# Patient Record
Sex: MALE | Race: WHITE | NOT HISPANIC OR LATINO | Employment: OTHER | URBAN - NONMETROPOLITAN AREA
[De-identification: names, ages, dates, MRNs, and addresses within clinical notes are randomized per-mention and may not be internally consistent; named-entity substitution may affect disease eponyms.]

---

## 2022-02-01 ENCOUNTER — APPOINTMENT (EMERGENCY)
Dept: CT IMAGING | Facility: HOSPITAL | Age: 47
End: 2022-02-01
Payer: COMMERCIAL

## 2022-02-01 ENCOUNTER — HOSPITAL ENCOUNTER (EMERGENCY)
Facility: HOSPITAL | Age: 47
Discharge: HOME | End: 2022-02-01
Attending: STUDENT IN AN ORGANIZED HEALTH CARE EDUCATION/TRAINING PROGRAM | Admitting: STUDENT IN AN ORGANIZED HEALTH CARE EDUCATION/TRAINING PROGRAM
Payer: COMMERCIAL

## 2022-02-01 VITALS
HEART RATE: 87 BPM | BODY MASS INDEX: 24.71 KG/M2 | HEIGHT: 72 IN | OXYGEN SATURATION: 96 % | SYSTOLIC BLOOD PRESSURE: 108 MMHG | TEMPERATURE: 100 F | RESPIRATION RATE: 16 BRPM | WEIGHT: 182.4 LBS | DIASTOLIC BLOOD PRESSURE: 78 MMHG

## 2022-02-01 DIAGNOSIS — S22.31XA CLOSED FRACTURE OF ONE RIB OF RIGHT SIDE, INITIAL ENCOUNTER: Primary | ICD-10-CM

## 2022-02-01 PROCEDURE — G1004 CDSM NDSC: HCPCS

## 2022-02-01 PROCEDURE — 99284 EMERGENCY DEPT VISIT MOD MDM: CPT

## 2022-02-01 PROCEDURE — 96372 THER/PROPH/DIAG INJ SC/IM: CPT

## 2022-02-01 PROCEDURE — 99284 EMERGENCY DEPT VISIT MOD MDM: CPT | Performed by: STUDENT IN AN ORGANIZED HEALTH CARE EDUCATION/TRAINING PROGRAM

## 2022-02-01 PROCEDURE — 71250 CT THORAX DX C-: CPT

## 2022-02-01 RX ORDER — KETOROLAC TROMETHAMINE 30 MG/ML
30 INJECTION, SOLUTION INTRAMUSCULAR; INTRAVENOUS ONCE
Status: COMPLETED | OUTPATIENT
Start: 2022-02-01 | End: 2022-02-01

## 2022-02-01 RX ADMIN — KETOROLAC TROMETHAMINE 30 MG: 30 INJECTION, SOLUTION INTRAMUSCULAR at 21:14

## 2022-02-02 NOTE — ED PROVIDER NOTES
History  Chief Complaint   Patient presents with   Layla Slay down an entire metal staircase 1/29 after slipping on ice  Right flank/rib pain  7/10 pain especially when breathing or stretching movements  No visible contusions  Needs proof of hospital visit for work  History provided by:  Patient  Chest Pain  Pain location:  R chest and R lateral chest  Pain quality: sharp and stabbing    Pain radiates to:  Does not radiate  Pain radiates to the back: no    Pain severity:  Severe  Onset quality:  Sudden  Duration:  3 days  Timing:  Constant  Progression:  Waxing and waning  Chronicity:  New  Context: breathing    Relieved by:  None tried  Worsened by:  Coughing and deep breathing  Ineffective treatments:  None tried  Associated symptoms: no abdominal pain, no back pain, no cough, no dizziness, no fatigue, no fever, no headache, no nausea, no palpitations, no shortness of breath, not vomiting and no weakness       56-year-old male  He states that he fell down 15 metal stairs on 01/29 after slipping on ice  Over the past few days, he has been having worsening right lateral chest, right anterior chest pain  Denies head strike, LOC, anticoagulation/antiplatelet medications  Hasn't been taking anything for pain  Pain is exacerbated with deep breaths, chest movement  Currently describes his pain as sharp in nature and 8/10 severity  Denies shortness of breath  History reviewed  No pertinent past medical history  History reviewed  No pertinent surgical history  History reviewed  No pertinent family history  I have reviewed and agree with the history as documented      E-Cigarette/Vaping     E-Cigarette/Vaping Substances     Social History     Tobacco Use    Smoking status: Current Every Day Smoker     Packs/day: 1 00     Types: Cigarettes    Smokeless tobacco: Never Used   Substance Use Topics    Alcohol use: Never    Drug use: Yes     Types: Methamphetamines     Comment: taking methamphetamines for pain control       Review of Systems   Constitutional: Negative for fatigue and fever  HENT: Negative for congestion, rhinorrhea, sinus pressure and sinus pain  Eyes: Negative for pain and visual disturbance  Respiratory: Positive for chest tightness  Negative for cough and shortness of breath  Cardiovascular: Positive for chest pain  Negative for palpitations  Gastrointestinal: Negative for abdominal pain, diarrhea, nausea and vomiting  Genitourinary: Negative for flank pain  Musculoskeletal: Negative for back pain, gait problem and neck pain  Skin: Negative for color change, pallor, rash and wound  Neurological: Negative for dizziness, weakness, light-headedness and headaches  Hematological: Does not bruise/bleed easily  Psychiatric/Behavioral: Negative for confusion  All other systems reviewed and are negative  Physical Exam  Physical Exam  Vitals and nursing note reviewed  Constitutional:       General: He is not in acute distress  Appearance: He is not ill-appearing or toxic-appearing  HENT:      Head: Normocephalic and atraumatic  Right Ear: External ear normal       Left Ear: External ear normal       Nose: No congestion or rhinorrhea  Eyes:      General:         Right eye: No discharge  Left eye: No discharge  Extraocular Movements: Extraocular movements intact  Conjunctiva/sclera: Conjunctivae normal    Cardiovascular:      Rate and Rhythm: Normal rate and regular rhythm  Pulses: Normal pulses  Heart sounds: Normal heart sounds  No murmur heard  Pulmonary:      Effort: Pulmonary effort is normal  No respiratory distress  Breath sounds: Normal breath sounds  No stridor  No wheezing, rhonchi or rales  Chest:      Chest wall: Tenderness present  Abdominal:      General: Abdomen is flat  Bowel sounds are normal  There is no distension  Palpations: Abdomen is soft  There is no mass        Tenderness: There is no abdominal tenderness  There is no right CVA tenderness, left CVA tenderness, guarding or rebound  Hernia: No hernia is present  Musculoskeletal:         General: Tenderness present  No swelling, deformity or signs of injury  Arms:       Cervical back: Neck supple  No tenderness  Right lower leg: No edema  Left lower leg: No edema  Comments: Tenderness to palpation over the right anterior and right lateral chest   No crepitus  No overlying bruising or skin changes noted  Skin:     General: Skin is warm and dry  Capillary Refill: Capillary refill takes less than 2 seconds  Coloration: Skin is not jaundiced or pale  Findings: No bruising, erythema or rash  Neurological:      General: No focal deficit present  Mental Status: He is alert and oriented to person, place, and time  Cranial Nerves: No cranial nerve deficit  Sensory: No sensory deficit  Motor: No weakness  Psychiatric:         Mood and Affect: Mood normal          Behavior: Behavior normal          Thought Content:  Thought content normal          Judgment: Judgment normal        Vital Signs  ED Triage Vitals   Temperature Pulse Respirations Blood Pressure SpO2   02/01/22 1902 02/01/22 1901 02/01/22 1901 02/01/22 1901 02/01/22 1901   100 °F (37 8 °C) 84 18 120/86 100 %      Temp Source Heart Rate Source Patient Position - Orthostatic VS BP Location FiO2 (%)   02/01/22 1902 02/01/22 1901 02/01/22 1901 02/01/22 1901 --   Temporal Monitor Sitting Right arm       Pain Score       --                  Vitals:    02/01/22 1901   BP: 120/86   Pulse: 84   Patient Position - Orthostatic VS: Sitting     Visual Acuity  Visual Acuity      Most Recent Value   L Pupil Size (mm) 3   R Pupil Size (mm) 3        ED Medications  Medications   ketorolac (TORADOL) injection 30 mg (30 mg Intramuscular Given 2/1/22 2114)     Diagnostic Studies  Results Reviewed     None             CT chest without contrast   Final Result by Venkata Lozano MD (02/01 2137)      Nondisplaced fracture of the posterolateral right 8th rib  Workstation performed: [id number]                Procedures  Procedures     ED Course       SBIRT 22yo+      Most Recent Value   SBIRT (22 yo +)    In order to provide better care to our patients, we are screening all of our patients for alcohol and drug use  Would it be okay to ask you these screening questions? No Filed at: 02/01/2022 1904          [facility]     67-year-old male  Presents to the emergency department with right anterior chest/right lateral chest pain secondary to a fall down stairs 3 days ago  The patient denies head strike, loss of consciousness  Does not take anticoagulation/anti-platelet medications  On exam, the patient has tenderness to palpation along the right anterior/lateral chest   No crepitus noted  No bruising or overlying skin changes noted  CT interpretation above  The patient was administered a dose of IM Toradol which improved his discomfort  Supportive care measures and return precautions were discussed with the patient  All questions were addressed  The patient was stable for discharge  Disposition  Final diagnoses:   Closed fracture of one rib of right side, initial encounter     Time reflects when diagnosis was documented in both MDM as applicable and the Disposition within this note     Time User Action Codes Description Comment    2/1/2022  9:44 PM Ingrid Davalos Add [S22 31XA] Closed fracture of one rib of right side, initial encounter       ED Disposition     ED Disposition Condition Date/Time Comment    Discharge Stable Tue Feb 1, 2022  9:43 PM Saúl Gagnon discharge to home/self care  Follow-up Information    None         Patient's Medications    No medications on file       No discharge procedures on file      PDMP Review     None          ED Provider  Electronically Signed by           Katharine Cope DO  02/01/22 [address] [place]

## 2022-02-02 NOTE — DISCHARGE INSTRUCTIONS
You were evaluated in the emergency department for right-sided chest pain secondary to a fall  It appears that you have a rib fracture (rib 8)  For pain, you can take Motrin 600 mg every 6 hours and/or Tylenol 1000 mg every 6 hours  Do not hesitate to return to the emergency department for any concerning signs or symptoms

## 2022-04-29 ENCOUNTER — APPOINTMENT (EMERGENCY)
Dept: RADIOLOGY | Facility: HOSPITAL | Age: 47
DRG: 317 | End: 2022-04-29
Payer: COMMERCIAL

## 2022-04-29 ENCOUNTER — APPOINTMENT (EMERGENCY)
Dept: CT IMAGING | Facility: HOSPITAL | Age: 47
DRG: 317 | End: 2022-04-29
Payer: COMMERCIAL

## 2022-04-29 ENCOUNTER — HOSPITAL ENCOUNTER (INPATIENT)
Facility: HOSPITAL | Age: 47
LOS: 4 days | Discharge: HOME HEALTH | DRG: 317 | End: 2022-05-04
Attending: EMERGENCY MEDICINE | Admitting: FAMILY MEDICINE
Payer: COMMERCIAL

## 2022-04-29 DIAGNOSIS — M71.161 SEPTIC PREPATELLAR BURSITIS OF RIGHT KNEE: Primary | ICD-10-CM

## 2022-04-29 DIAGNOSIS — M00.061 STAPHYLOCOCCAL ARTHRITIS OF RIGHT KNEE (HCC): ICD-10-CM

## 2022-04-29 DIAGNOSIS — M25.461 EFFUSION OF RIGHT KNEE: ICD-10-CM

## 2022-04-29 PROBLEM — D72.829 LEUKOCYTOSIS: Status: ACTIVE | Noted: 2022-04-29

## 2022-04-29 PROBLEM — L03.115 CELLULITIS OF RIGHT KNEE: Status: ACTIVE | Noted: 2022-04-29

## 2022-04-29 PROBLEM — E87.1 HYPONATREMIA: Status: ACTIVE | Noted: 2022-04-29

## 2022-04-29 LAB
ANION GAP SERPL CALCULATED.3IONS-SCNC: 5 MMOL/L (ref 4–13)
APPEARANCE FLD: CLEAR
BASOPHILS # BLD AUTO: 0.05 THOUSANDS/ΜL (ref 0–0.1)
BASOPHILS NFR BLD AUTO: 0 % (ref 0–1)
BUN SERPL-MCNC: 11 MG/DL (ref 5–25)
CALCIUM SERPL-MCNC: 8.9 MG/DL (ref 8.3–10.1)
CHLORIDE SERPL-SCNC: 100 MMOL/L (ref 100–108)
CO2 SERPL-SCNC: 29 MMOL/L (ref 21–32)
COLOR FLD: YELLOW
CREAT SERPL-MCNC: 1.11 MG/DL (ref 0.6–1.3)
CRP SERPL QL: 142.4 MG/L
EOSINOPHIL # BLD AUTO: 0.08 THOUSAND/ΜL (ref 0–0.61)
EOSINOPHIL NFR BLD AUTO: 1 % (ref 0–6)
ERYTHROCYTE [DISTWIDTH] IN BLOOD BY AUTOMATED COUNT: 13.2 % (ref 11.6–15.1)
ERYTHROCYTE [SEDIMENTATION RATE] IN BLOOD: 28 MM/HOUR (ref 0–14)
GFR SERPL CREATININE-BSD FRML MDRD: 79 ML/MIN/1.73SQ M
GLUCOSE SERPL-MCNC: 143 MG/DL (ref 65–140)
HCT VFR BLD AUTO: 43.3 % (ref 36.5–49.3)
HGB BLD-MCNC: 14.4 G/DL (ref 12–17)
HISTIOCYTES NFR SNV MANUAL: 88 %
IMM GRANULOCYTES # BLD AUTO: 0.08 THOUSAND/UL (ref 0–0.2)
IMM GRANULOCYTES NFR BLD AUTO: 1 % (ref 0–2)
LACTATE SERPL-SCNC: 1.9 MMOL/L (ref 0.5–2)
LYMPHOCYTES # BLD AUTO: 1.81 THOUSANDS/ΜL (ref 0.6–4.47)
LYMPHOCYTES # SNV MANUAL: 2 %
LYMPHOCYTES NFR BLD AUTO: 11 % (ref 14–44)
MCH RBC QN AUTO: 32.2 PG (ref 26.8–34.3)
MCHC RBC AUTO-ENTMCNC: 33.3 G/DL (ref 31.4–37.4)
MCV RBC AUTO: 97 FL (ref 82–98)
MONOCYTES # BLD AUTO: 1.32 THOUSAND/ΜL (ref 0.17–1.22)
MONOCYTES NFR BLD AUTO: 8 % (ref 4–12)
MONOCYTES NFR SNV MANUAL: 5 %
NEUTROPHILS # BLD AUTO: 13.34 THOUSANDS/ΜL (ref 1.85–7.62)
NEUTROPHILS NFR SNV MANUAL: 5 %
NEUTS SEG NFR BLD AUTO: 79 % (ref 43–75)
NRBC BLD AUTO-RTO: 0 /100 WBCS
PLATELET # BLD AUTO: 330 THOUSANDS/UL (ref 149–390)
PMV BLD AUTO: 8.8 FL (ref 8.9–12.7)
POTASSIUM SERPL-SCNC: 4.1 MMOL/L (ref 3.5–5.3)
RBC # BLD AUTO: 4.47 MILLION/UL (ref 3.88–5.62)
SITE: ABNORMAL
SODIUM SERPL-SCNC: 134 MMOL/L (ref 136–145)
TOTAL CELLS COUNTED SPEC: 100
WBC # BLD AUTO: 16.68 THOUSAND/UL (ref 4.31–10.16)
WBC # FLD MANUAL: 331 /UL (ref 0–200)

## 2022-04-29 PROCEDURE — 83605 ASSAY OF LACTIC ACID: CPT | Performed by: EMERGENCY MEDICINE

## 2022-04-29 PROCEDURE — 0S9C3ZX DRAINAGE OF RIGHT KNEE JOINT, PERCUTANEOUS APPROACH, DIAGNOSTIC: ICD-10-PCS | Performed by: EMERGENCY MEDICINE

## 2022-04-29 PROCEDURE — 96375 TX/PRO/DX INJ NEW DRUG ADDON: CPT

## 2022-04-29 PROCEDURE — 99285 EMERGENCY DEPT VISIT HI MDM: CPT | Performed by: EMERGENCY MEDICINE

## 2022-04-29 PROCEDURE — 87205 SMEAR GRAM STAIN: CPT | Performed by: EMERGENCY MEDICINE

## 2022-04-29 PROCEDURE — 89051 BODY FLUID CELL COUNT: CPT | Performed by: EMERGENCY MEDICINE

## 2022-04-29 PROCEDURE — 20610 DRAIN/INJ JOINT/BURSA W/O US: CPT | Performed by: EMERGENCY MEDICINE

## 2022-04-29 PROCEDURE — 80048 BASIC METABOLIC PNL TOTAL CA: CPT | Performed by: EMERGENCY MEDICINE

## 2022-04-29 PROCEDURE — 36415 COLL VENOUS BLD VENIPUNCTURE: CPT | Performed by: EMERGENCY MEDICINE

## 2022-04-29 PROCEDURE — 85025 COMPLETE CBC W/AUTO DIFF WBC: CPT | Performed by: EMERGENCY MEDICINE

## 2022-04-29 PROCEDURE — 73562 X-RAY EXAM OF KNEE 3: CPT

## 2022-04-29 PROCEDURE — 84145 PROCALCITONIN (PCT): CPT

## 2022-04-29 PROCEDURE — 87070 CULTURE OTHR SPECIMN AEROBIC: CPT | Performed by: EMERGENCY MEDICINE

## 2022-04-29 PROCEDURE — 86618 LYME DISEASE ANTIBODY: CPT | Performed by: EMERGENCY MEDICINE

## 2022-04-29 PROCEDURE — 86140 C-REACTIVE PROTEIN: CPT | Performed by: EMERGENCY MEDICINE

## 2022-04-29 PROCEDURE — 96374 THER/PROPH/DIAG INJ IV PUSH: CPT

## 2022-04-29 PROCEDURE — 96376 TX/PRO/DX INJ SAME DRUG ADON: CPT

## 2022-04-29 PROCEDURE — 87040 BLOOD CULTURE FOR BACTERIA: CPT | Performed by: EMERGENCY MEDICINE

## 2022-04-29 PROCEDURE — G1004 CDSM NDSC: HCPCS

## 2022-04-29 PROCEDURE — 99285 EMERGENCY DEPT VISIT HI MDM: CPT

## 2022-04-29 PROCEDURE — 73700 CT LOWER EXTREMITY W/O DYE: CPT

## 2022-04-29 PROCEDURE — 85652 RBC SED RATE AUTOMATED: CPT | Performed by: EMERGENCY MEDICINE

## 2022-04-29 PROCEDURE — 99219 PR INITIAL OBSERVATION CARE/DAY 50 MINUTES: CPT

## 2022-04-29 RX ORDER — CEFEPIME HYDROCHLORIDE 2 G/50ML
2000 INJECTION, SOLUTION INTRAVENOUS ONCE
Status: COMPLETED | OUTPATIENT
Start: 2022-04-29 | End: 2022-04-29

## 2022-04-29 RX ORDER — SODIUM CHLORIDE 9 MG/ML
100 INJECTION, SOLUTION INTRAVENOUS CONTINUOUS
Status: DISCONTINUED | OUTPATIENT
Start: 2022-04-30 | End: 2022-04-30

## 2022-04-29 RX ORDER — KETOROLAC TROMETHAMINE 30 MG/ML
15 INJECTION, SOLUTION INTRAMUSCULAR; INTRAVENOUS ONCE
Status: COMPLETED | OUTPATIENT
Start: 2022-04-29 | End: 2022-04-29

## 2022-04-29 RX ORDER — ONDANSETRON 2 MG/ML
4 INJECTION INTRAMUSCULAR; INTRAVENOUS EVERY 6 HOURS PRN
Status: DISCONTINUED | OUTPATIENT
Start: 2022-04-29 | End: 2022-05-04 | Stop reason: HOSPADM

## 2022-04-29 RX ORDER — ACETAMINOPHEN 325 MG/1
650 TABLET ORAL EVERY 6 HOURS PRN
Status: DISCONTINUED | OUTPATIENT
Start: 2022-04-29 | End: 2022-05-04 | Stop reason: HOSPADM

## 2022-04-29 RX ORDER — MORPHINE SULFATE 4 MG/ML
4 INJECTION, SOLUTION INTRAMUSCULAR; INTRAVENOUS ONCE
Status: COMPLETED | OUTPATIENT
Start: 2022-04-29 | End: 2022-04-29

## 2022-04-29 RX ORDER — CEFEPIME HYDROCHLORIDE 2 G/50ML
2000 INJECTION, SOLUTION INTRAVENOUS EVERY 12 HOURS
Status: DISCONTINUED | OUTPATIENT
Start: 2022-04-30 | End: 2022-05-03

## 2022-04-29 RX ORDER — NICOTINE 21 MG/24HR
1 PATCH, TRANSDERMAL 24 HOURS TRANSDERMAL DAILY
Status: DISCONTINUED | OUTPATIENT
Start: 2022-04-30 | End: 2022-05-04 | Stop reason: HOSPADM

## 2022-04-29 RX ADMIN — MORPHINE SULFATE 4 MG: 4 INJECTION INTRAVENOUS at 19:19

## 2022-04-29 RX ADMIN — VANCOMYCIN HYDROCHLORIDE 1250 MG: 1 INJECTION, POWDER, LYOPHILIZED, FOR SOLUTION INTRAVENOUS at 23:20

## 2022-04-29 RX ADMIN — CEFEPIME HYDROCHLORIDE 2000 MG: 2 INJECTION, SOLUTION INTRAVENOUS at 22:46

## 2022-04-29 RX ADMIN — MORPHINE SULFATE 4 MG: 4 INJECTION INTRAVENOUS at 20:13

## 2022-04-29 RX ADMIN — KETOROLAC TROMETHAMINE 15 MG: 30 INJECTION, SOLUTION INTRAMUSCULAR at 19:19

## 2022-04-29 NOTE — ED PROVIDER NOTES
History  Chief Complaint   Patient presents with    Knee Pain     R knee pain x 2 days, denies injury, unable to bear weight  R knee is red and endematous  Patient is a 69-year-old male presenting to the emergency department complaining of worsening right knee pain, redness and swelling over the past 2 days, he denies any injury, no history of similar symptoms previously, he has not done any repetitive motion with the knee, he does not kneel on his knees frequently, he denies pain or injury elsewhere          None       History reviewed  No pertinent past medical history  History reviewed  No pertinent surgical history  History reviewed  No pertinent family history  I have reviewed and agree with the history as documented  E-Cigarette/Vaping     E-Cigarette/Vaping Substances     Social History     Tobacco Use    Smoking status: Current Every Day Smoker     Packs/day: 1 00     Types: Cigarettes    Smokeless tobacco: Never Used   Substance Use Topics    Alcohol use: Never    Drug use: Not Currently     Types: Methamphetamines     Comment: taking methamphetamines for pain control       Review of Systems   Constitutional: Negative  HENT: Negative  Eyes: Negative  Respiratory: Negative  Cardiovascular: Negative  Gastrointestinal: Negative  Endocrine: Negative  Genitourinary: Negative  Musculoskeletal: Positive for arthralgias and joint swelling  Skin: Positive for color change  Allergic/Immunologic: Negative  Neurological: Negative  Hematological: Negative  Psychiatric/Behavioral: Negative  Physical Exam  Physical Exam  Constitutional:       Appearance: He is well-developed  HENT:      Head: Normocephalic and atraumatic  Eyes:      Conjunctiva/sclera: Conjunctivae normal       Pupils: Pupils are equal, round, and reactive to light  Cardiovascular:      Rate and Rhythm: Normal rate     Pulmonary:      Effort: Pulmonary effort is normal    Abdominal: Palpations: Abdomen is soft  Musculoskeletal:         General: Swelling and tenderness present  Normal range of motion  Cervical back: Normal range of motion and neck supple  Legs:       Comments: Right knee with significant erythema, swelling, increased warmth, tenderness to palpate, pain with range of motion testing, distal sensation and motor is intact    Just superior lateral to the left knee is a large growth, it is nontender, no erythema, does not affect range of motion of the joint, consistent with fatty lipoma   Skin:     General: Skin is warm and dry  Findings: Erythema present  Neurological:      Mental Status: He is alert and oriented to person, place, and time                       Vital Signs  ED Triage Vitals [04/29/22 1848]   Temperature Pulse Respirations Blood Pressure SpO2   99 1 °F (37 3 °C) 97 18 129/75 99 %      Temp Source Heart Rate Source Patient Position - Orthostatic VS BP Location FiO2 (%)   Temporal Monitor Lying Right arm --      Pain Score       10 - Worst Possible Pain           Vitals:    04/30/22 0955 04/30/22 1004 04/30/22 1128 04/30/22 1417   BP: 128/84 124/75 124/75 117/74   Pulse: 76 78 77 83   Patient Position - Orthostatic VS:                   ED Medications  Medications   acetaminophen (TYLENOL) tablet 650 mg (has no administration in time range)   ondansetron (ZOFRAN) injection 4 mg (has no administration in time range)   nicotine (NICODERM CQ) 14 mg/24hr TD 24 hr patch 1 patch (1 patch Transdermal Not Given 4/30/22 1027)   cefepime (MAXIPIME) IVPB (premix in dextrose) 2,000 mg 50 mL (2,000 mg Intravenous New Bag 4/30/22 1125)   morphine injection 2 mg (2 mg Intravenous Given 4/30/22 1125)   sodium chloride 0 9 % infusion (125 mL/hr Intravenous New Bag 4/30/22 1539)   oxyCODONE-acetaminophen (PERCOCET) 5-325 mg per tablet 2 tablet (2 tablets Oral Given 4/30/22 1035)   enoxaparin (LOVENOX) subcutaneous injection 40 mg (has no administration in time range) HYDROmorphone HCl (DILAUDID) injection 0 2 mg (has no administration in time range)   naloxone (NARCAN) 0 04 mg/mL syringe 0 04 mg (has no administration in time range)   vancomycin (VANCOCIN) 1,250 mg in sodium chloride 0 9 % 250 mL IVPB (has no administration in time range)   ketorolac (TORADOL) injection 15 mg (15 mg Intravenous Given 4/29/22 1919)   morphine (PF) 4 mg/mL injection 4 mg (4 mg Intravenous Given 4/29/22 1919)   morphine (PF) 4 mg/mL injection 4 mg (4 mg Intravenous Given 4/29/22 2013)   vancomycin (VANCOCIN) 1250 mg in sodium chloride 0 9% 250 mL IVPB (0 mg Intravenous Stopped 4/30/22 0700)   cefepime (MAXIPIME) IVPB (premix in dextrose) 2,000 mg 50 mL (0 mg Intravenous Stopped 4/29/22 2318)   ceFAZolin (ANCEF) IVPB (premix in dextrose) 2,000 mg 50 mL (2,000 mg Intravenous Given 4/30/22 0843)       Diagnostic Studies  Results Reviewed     Procedure Component Value Units Date/Time    Blood culture #1 [[id number]] Collected: 04/29/22 1908    Lab Status: Preliminary result Specimen: Blood from Arm, Left Updated: 04/30/22 0001     Blood Culture Received in Microbiology Lab  Culture in Progress  Blood culture #2 [[id number]] Collected: 04/29/22 1908    Lab Status: Preliminary result Specimen: Blood from Arm, Right Updated: 04/30/22 0001     Blood Culture Received in Microbiology Lab  Culture in Progress      Synovial Fluid Diff [[id number]] Collected: 04/29/22 2015    Lab Status: Final result Specimen: Synovial Fluid from Joint, Right Knee Updated: 04/29/22 2232     Total Counted 100     Neutrophil % Synovial 5 %      Lymph % Synovial 2 %      Monocyte % Synovial 5 %      Histiocyte % Synovial 88 %     Synovial fluid white cell count w/ diff [[id number]]  (Abnormal) Collected: 04/29/22 2015    Lab Status: Final result Specimen: Synovial Fluid from Joint, Right Knee Updated: 04/29/22 2124     Site Synovial     Color, Fluid Yellow     Clarity, Fluid Clear     WBC, Fluid 331 /ul     Lyme Antibody Profile with reflex to [facility] [[id number]] Collected: 04/29/22 2039    Lab Status: In process Specimen: Blood from Arm, Right Updated: 04/29/22 2041    Body fluid culture and Gram stain [[id number]] Collected: 04/29/22 2027    Lab Status: In process Specimen: Body Fluid from Joint, Right Knee Updated: 04/29/22 2032    Lactic acid [[id number]]  (Normal) Collected: 04/29/22 1905    Lab Status: Final result Specimen: Blood from Arm, Left Updated: 04/29/22 1939     LACTIC ACID 1 9 mmol/L     Narrative:      Result may be elevated if tourniquet was used during collection  Basic metabolic panel [[id number]]  (Abnormal) Collected: 04/29/22 0705    Lab Status: Final result Specimen: Blood from Arm, Left Updated: 04/29/22 1932     Sodium 134 mmol/L      Potassium 4 1 mmol/L      Chloride 100 mmol/L      CO2 29 mmol/L      ANION GAP 5 mmol/L      BUN 11 mg/dL      Creatinine 1 11 mg/dL      Glucose 143 mg/dL      Calcium 8 9 mg/dL      eGFR 79 ml/min/1 73sq m     Narrative:      [facility] guidelines for Chronic Kidney Disease (CKD):     Stage 1 with normal or high GFR (GFR > 90 mL/min/1 73 square meters)    Stage 2 Mild CKD (GFR = 60-89 mL/min/1 73 square meters)    Stage 3A Moderate CKD (GFR = 45-59 mL/min/1 73 square meters)    Stage 3B Moderate CKD (GFR = 30-44 mL/min/1 73 square meters)    Stage 4 Severe CKD (GFR = 15-29 mL/min/1 73 square meters)    Stage 5 End Stage CKD (GFR <15 mL/min/1 73 square meters)  Note: GFR calculation is accurate only with a steady state creatinine    C-reactive protein [[id number]]  (Abnormal) Collected: 04/29/22 0705    Lab Status: Final result Specimen: Blood from Arm, Left Updated: 04/29/22 1932      4 mg/L     Narrative:      Note: Unit of Measure change to mg/L at [facility] will show at 10 fold increase in CRP result to match network standards      Sedimentation rate, automated [[id number]]  (Abnormal) Collected: 04/29/22 0705    Lab Status: Final result Specimen: Blood from Arm, Left Updated: 04/29/22 1927     Sed Rate 28 mm/hour     CBC and differential [[id number]]  (Abnormal) Collected: 04/29/22 0705    Lab Status: Final result Specimen: Blood from Arm, Left Updated: 04/29/22 1922     WBC 16 68 Thousand/uL      RBC 4 47 Million/uL      Hemoglobin 14 4 g/dL      Hematocrit 43 3 %      MCV 97 fL      MCH 32 2 pg      MCHC 33 3 g/dL      RDW 13 2 %      MPV 8 8 fL      Platelets 280 Thousands/uL      nRBC 0 /100 WBCs      Neutrophils Relative 79 %      Immat GRANS % 1 %      Lymphocytes Relative 11 %      Monocytes Relative 8 %      Eosinophils Relative 1 %      Basophils Relative 0 %      Neutrophils Absolute 13 34 Thousands/µL      Immature Grans Absolute 0 08 Thousand/uL      Lymphocytes Absolute 1 81 Thousands/µL      Monocytes Absolute 1 32 Thousand/µL      Eosinophils Absolute 0 08 Thousand/µL      Basophils Absolute 0 05 Thousands/µL                  CT lower extremity wo contrast right   Final Result by Efrem Castañeda MD (04/30 4374)      Please note that evaluation for a focal fluid collection is significantly limited without intravenous contrast material         Findings consistent with anterior cellulitis and possible prepatellar bursitis (septic versus aseptic)  No radiopaque foreign body  No acute osseous abnormality  No significant knee joint effusion  This report is essentially concordant with the preliminary interpretation provided by [facility])  Workstation performed: [id number]         XR knee 3 views right non injury   Final Result by Efrem Castañeda MD (04/30 9140)      No acute osseous abnormality  Prepatellar soft tissue swelling        Workstation performed: [id number]                    Procedures  Arthrocentesis    Date/Time: 4/29/2022 8:21 PM  Performed by: Jesus Tao DO  Authorized by: Jesus Tao DO     Location:  Bedside  Verbal consent obtained?: Yes    Risks and benefits: Risks, benefits and alternatives were discussed    Consent given by:  Patient  Patient states understanding of procedure being performed: Yes    Required items: Required blood products, implants, devices and special equipment available    Patient identity confirmed:  Verbally with patient and arm band  Indications:  Joint swelling, pain, possible septic joint and diagnostic evaluation  Body area:  Knee  Joint:  Right knee  Local anesthesia used?: Yes    Anesthesia:  Local infiltration  Local anesthetic:  Lidocaine 1% with epinephrine  Anesthetic total (ml):  4  Preparation: Patient was prepped and draped in usual sterile fashion    Needle size:  18 G  Ultrasound guidance: Yes    Approach:  Lateral  Aspirate amount (ml):  3  Aspirate:  Clear and yellow  Patient tolerance:  Patient tolerated the procedure well with no immediate complications   Initially attempted medial approach, unable to aspirate any synovial fluid             ED Course  ED Course as of 04/30/22 1855 Fri Apr 29, 2022 2023 Patient endorsed to Dr Wanda Cartagena, will follow synovial fluid analysis, provide treatment as indicated and disposition                               SBIRT 22yo+      Most Recent Value   SBIRT (25 yo +)    In order to provide better care to our patients, we are screening all of our patients for alcohol and drug use  Would it be okay to ask you these screening questions?  No Filed at: 04/29/2022 1853                      Disposition  Final diagnoses:   Effusion of right knee     Time reflects when diagnosis was documented in both MDM as applicable and the Disposition within this note     Time User Action Codes Description Comment    4/29/2022 10:33 PM [facility] Add [M25 461] Effusion of right knee     4/29/2022 10:57 PM Shimon Starkey Add [M00 061] Staphylococcal arthritis of right knee ([address] )     4/30/2022  9:06 AM Lynda Rides Modify [M25 461] Effusion of right knee     4/30/2022  9:06 AM Lynda Rides Modify [M00 061] Staphylococcal arthritis of right knee ([facility] )     4/30/2022  9:22 AM [place] Knee [facility] EILEEN Modify [P55 732] Effusion of right knee     4/30/2022  9:22 AM [place] Knee Sk Modify [M00 061] Staphyl[facility] arthritis of right [facility] ([facility]     4/30/2022  9:22 AM [place] Knee Sk Add [O71 109] Septic prepatellar bursitis of right knee       ED Disposition     ED Disposition Condition Date/Time Comment    Admit Stable Fri Apr 29, 2022 10:59 PM Case was discussed with Derek Metcalf and the patient's admission status was agreed to be Admission Status: observation status to the service of Dr Maryellen Morgan          Follow-up Information    None         There are no discharge medications for this patient  No discharge procedures on file      PDMP Review     None          ED Provider  Electronically Signed by           Donivan Riedel, DO  04/30/22 4308

## 2022-04-29 NOTE — ED NOTES
Lab aware of collection time being incorrectly documented - correct collection time for labs was 19:05     Tracey Hooker RN  04/29/22 1942

## 2022-04-30 ENCOUNTER — ANESTHESIA (OUTPATIENT)
Dept: PERIOP | Facility: HOSPITAL | Age: 47
DRG: 317 | End: 2022-04-30
Payer: COMMERCIAL

## 2022-04-30 ENCOUNTER — ANESTHESIA EVENT (OUTPATIENT)
Dept: PERIOP | Facility: HOSPITAL | Age: 47
DRG: 317 | End: 2022-04-30
Payer: COMMERCIAL

## 2022-04-30 PROBLEM — M70.41 PREPATELLAR BURSITIS OF RIGHT KNEE: Status: ACTIVE | Noted: 2022-04-30

## 2022-04-30 LAB
ALBUMIN SERPL BCP-MCNC: 3 G/DL (ref 3.5–5)
ALP SERPL-CCNC: 89 U/L (ref 46–116)
ALT SERPL W P-5'-P-CCNC: 35 U/L (ref 12–78)
ANION GAP SERPL CALCULATED.3IONS-SCNC: 5 MMOL/L (ref 4–13)
AST SERPL W P-5'-P-CCNC: 15 U/L (ref 5–45)
BASOPHILS # BLD AUTO: 0.07 THOUSANDS/ΜL (ref 0–0.1)
BASOPHILS NFR BLD AUTO: 0 % (ref 0–1)
BILIRUB SERPL-MCNC: 0.9 MG/DL (ref 0.2–1)
BUN SERPL-MCNC: 14 MG/DL (ref 5–25)
CALCIUM ALBUM COR SERPL-MCNC: 10.1 MG/DL (ref 8.3–10.1)
CALCIUM SERPL-MCNC: 9.3 MG/DL (ref 8.3–10.1)
CHLORIDE SERPL-SCNC: 101 MMOL/L (ref 100–108)
CO2 SERPL-SCNC: 28 MMOL/L (ref 21–32)
CREAT SERPL-MCNC: 0.96 MG/DL (ref 0.6–1.3)
EOSINOPHIL # BLD AUTO: 0.22 THOUSAND/ΜL (ref 0–0.61)
EOSINOPHIL NFR BLD AUTO: 1 % (ref 0–6)
ERYTHROCYTE [DISTWIDTH] IN BLOOD BY AUTOMATED COUNT: 13.2 % (ref 11.6–15.1)
GFR SERPL CREATININE-BSD FRML MDRD: 95 ML/MIN/1.73SQ M
GLUCOSE SERPL-MCNC: 97 MG/DL (ref 65–140)
HCT VFR BLD AUTO: 42.1 % (ref 36.5–49.3)
HGB BLD-MCNC: 13.8 G/DL (ref 12–17)
IMM GRANULOCYTES # BLD AUTO: 0.06 THOUSAND/UL (ref 0–0.2)
IMM GRANULOCYTES NFR BLD AUTO: 0 % (ref 0–2)
INR PPP: 0.94 (ref 0.84–1.19)
LYMPHOCYTES # BLD AUTO: 1.87 THOUSANDS/ΜL (ref 0.6–4.47)
LYMPHOCYTES NFR BLD AUTO: 12 % (ref 14–44)
MCH RBC QN AUTO: 31.9 PG (ref 26.8–34.3)
MCHC RBC AUTO-ENTMCNC: 32.8 G/DL (ref 31.4–37.4)
MCV RBC AUTO: 97 FL (ref 82–98)
MONOCYTES # BLD AUTO: 1.75 THOUSAND/ΜL (ref 0.17–1.22)
MONOCYTES NFR BLD AUTO: 11 % (ref 4–12)
NEUTROPHILS # BLD AUTO: 11.94 THOUSANDS/ΜL (ref 1.85–7.62)
NEUTS SEG NFR BLD AUTO: 76 % (ref 43–75)
NRBC BLD AUTO-RTO: 0 /100 WBCS
PLATELET # BLD AUTO: 325 THOUSANDS/UL (ref 149–390)
PMV BLD AUTO: 8.6 FL (ref 8.9–12.7)
POTASSIUM SERPL-SCNC: 3.8 MMOL/L (ref 3.5–5.3)
PROCALCITONIN SERPL-MCNC: 0.16 NG/ML
PROT SERPL-MCNC: 6.8 G/DL (ref 6.4–8.2)
PROTHROMBIN TIME: 12.5 SECONDS (ref 11.6–14.5)
RBC # BLD AUTO: 4.33 MILLION/UL (ref 3.88–5.62)
SODIUM SERPL-SCNC: 134 MMOL/L (ref 136–145)
WBC # BLD AUTO: 15.91 THOUSAND/UL (ref 4.31–10.16)

## 2022-04-30 PROCEDURE — 99233 SBSQ HOSP IP/OBS HIGH 50: CPT | Performed by: FAMILY MEDICINE

## 2022-04-30 PROCEDURE — NC001 PR NO CHARGE: Performed by: ORTHOPAEDIC SURGERY

## 2022-04-30 PROCEDURE — NC001 PR NO CHARGE: Performed by: PHYSICIAN ASSISTANT

## 2022-04-30 PROCEDURE — 87147 CULTURE TYPE IMMUNOLOGIC: CPT | Performed by: ORTHOPAEDIC SURGERY

## 2022-04-30 PROCEDURE — 85025 COMPLETE CBC W/AUTO DIFF WBC: CPT

## 2022-04-30 PROCEDURE — 0MBN0ZZ EXCISION OF RIGHT KNEE BURSA AND LIGAMENT, OPEN APPROACH: ICD-10-PCS | Performed by: ORTHOPAEDIC SURGERY

## 2022-04-30 PROCEDURE — 87205 SMEAR GRAM STAIN: CPT | Performed by: ORTHOPAEDIC SURGERY

## 2022-04-30 PROCEDURE — 27340 REMOVAL OF KNEECAP BURSA: CPT | Performed by: ORTHOPAEDIC SURGERY

## 2022-04-30 PROCEDURE — 87075 CULTR BACTERIA EXCEPT BLOOD: CPT | Performed by: ORTHOPAEDIC SURGERY

## 2022-04-30 PROCEDURE — 27340 REMOVAL OF KNEECAP BURSA: CPT | Performed by: PHYSICIAN ASSISTANT

## 2022-04-30 PROCEDURE — 87176 TISSUE HOMOGENIZATION CULTR: CPT | Performed by: ORTHOPAEDIC SURGERY

## 2022-04-30 PROCEDURE — 88304 TISSUE EXAM BY PATHOLOGIST: CPT | Performed by: PATHOLOGY

## 2022-04-30 PROCEDURE — 87070 CULTURE OTHR SPECIMN AEROBIC: CPT | Performed by: ORTHOPAEDIC SURGERY

## 2022-04-30 PROCEDURE — 87081 CULTURE SCREEN ONLY: CPT

## 2022-04-30 PROCEDURE — 0S9C3ZX DRAINAGE OF RIGHT KNEE JOINT, PERCUTANEOUS APPROACH, DIAGNOSTIC: ICD-10-PCS | Performed by: ORTHOPAEDIC SURGERY

## 2022-04-30 PROCEDURE — 99254 IP/OBS CNSLTJ NEW/EST MOD 60: CPT | Performed by: PHYSICIAN ASSISTANT

## 2022-04-30 PROCEDURE — 80053 COMPREHEN METABOLIC PANEL: CPT

## 2022-04-30 PROCEDURE — 87181 SC STD AGAR DILUTION PER AGT: CPT | Performed by: ORTHOPAEDIC SURGERY

## 2022-04-30 PROCEDURE — 85610 PROTHROMBIN TIME: CPT

## 2022-04-30 RX ORDER — PROPOFOL 10 MG/ML
INJECTION, EMULSION INTRAVENOUS AS NEEDED
Status: DISCONTINUED | OUTPATIENT
Start: 2022-04-30 | End: 2022-04-30

## 2022-04-30 RX ORDER — KETAMINE HYDROCHLORIDE 50 MG/ML
INJECTION, SOLUTION, CONCENTRATE INTRAMUSCULAR; INTRAVENOUS AS NEEDED
Status: DISCONTINUED | OUTPATIENT
Start: 2022-04-30 | End: 2022-04-30

## 2022-04-30 RX ORDER — FENTANYL CITRATE/PF 50 MCG/ML
25 SYRINGE (ML) INJECTION
Status: DISCONTINUED | OUTPATIENT
Start: 2022-04-30 | End: 2022-04-30 | Stop reason: HOSPADM

## 2022-04-30 RX ORDER — SODIUM CHLORIDE 9 MG/ML
125 INJECTION, SOLUTION INTRAVENOUS CONTINUOUS
Status: DISCONTINUED | OUTPATIENT
Start: 2022-04-30 | End: 2022-05-02

## 2022-04-30 RX ORDER — ONDANSETRON 2 MG/ML
INJECTION INTRAMUSCULAR; INTRAVENOUS AS NEEDED
Status: DISCONTINUED | OUTPATIENT
Start: 2022-04-30 | End: 2022-04-30

## 2022-04-30 RX ORDER — LIDOCAINE HYDROCHLORIDE 10 MG/ML
INJECTION, SOLUTION EPIDURAL; INFILTRATION; INTRACAUDAL; PERINEURAL AS NEEDED
Status: DISCONTINUED | OUTPATIENT
Start: 2022-04-30 | End: 2022-04-30

## 2022-04-30 RX ORDER — HYDROMORPHONE HCL IN WATER/PF 6 MG/30 ML
0.2 PATIENT CONTROLLED ANALGESIA SYRINGE INTRAVENOUS EVERY 4 HOURS PRN
Status: DISCONTINUED | OUTPATIENT
Start: 2022-04-30 | End: 2022-05-04 | Stop reason: HOSPADM

## 2022-04-30 RX ORDER — CEFAZOLIN SODIUM 2 G/50ML
2000 SOLUTION INTRAVENOUS ONCE
Status: COMPLETED | OUTPATIENT
Start: 2022-04-30 | End: 2022-04-30

## 2022-04-30 RX ORDER — CHLORHEXIDINE GLUCONATE 0.12 MG/ML
15 RINSE ORAL ONCE
Status: DISCONTINUED | OUTPATIENT
Start: 2022-04-30 | End: 2022-04-30

## 2022-04-30 RX ORDER — KETOROLAC TROMETHAMINE 30 MG/ML
INJECTION, SOLUTION INTRAMUSCULAR; INTRAVENOUS AS NEEDED
Status: DISCONTINUED | OUTPATIENT
Start: 2022-04-30 | End: 2022-04-30

## 2022-04-30 RX ORDER — CHLORHEXIDINE GLUCONATE 4 G/100ML
SOLUTION TOPICAL DAILY PRN
Status: DISCONTINUED | OUTPATIENT
Start: 2022-04-30 | End: 2022-04-30

## 2022-04-30 RX ORDER — FENTANYL CITRATE 50 UG/ML
INJECTION, SOLUTION INTRAMUSCULAR; INTRAVENOUS AS NEEDED
Status: DISCONTINUED | OUTPATIENT
Start: 2022-04-30 | End: 2022-04-30

## 2022-04-30 RX ORDER — ONDANSETRON 2 MG/ML
4 INJECTION INTRAMUSCULAR; INTRAVENOUS ONCE AS NEEDED
Status: DISCONTINUED | OUTPATIENT
Start: 2022-04-30 | End: 2022-04-30 | Stop reason: HOSPADM

## 2022-04-30 RX ORDER — ALBUTEROL SULFATE 2.5 MG/3ML
2.5 SOLUTION RESPIRATORY (INHALATION) ONCE AS NEEDED
Status: DISCONTINUED | OUTPATIENT
Start: 2022-04-30 | End: 2022-04-30 | Stop reason: HOSPADM

## 2022-04-30 RX ORDER — DEXMEDETOMIDINE HYDROCHLORIDE 100 UG/ML
INJECTION, SOLUTION INTRAVENOUS AS NEEDED
Status: DISCONTINUED | OUTPATIENT
Start: 2022-04-30 | End: 2022-04-30

## 2022-04-30 RX ORDER — MIDAZOLAM HYDROCHLORIDE 2 MG/2ML
INJECTION, SOLUTION INTRAMUSCULAR; INTRAVENOUS AS NEEDED
Status: DISCONTINUED | OUTPATIENT
Start: 2022-04-30 | End: 2022-04-30

## 2022-04-30 RX ORDER — HYDROMORPHONE HCL/PF 1 MG/ML
SYRINGE (ML) INJECTION AS NEEDED
Status: DISCONTINUED | OUTPATIENT
Start: 2022-04-30 | End: 2022-04-30

## 2022-04-30 RX ORDER — DEXAMETHASONE SODIUM PHOSPHATE 10 MG/ML
INJECTION, SOLUTION INTRAMUSCULAR; INTRAVENOUS AS NEEDED
Status: DISCONTINUED | OUTPATIENT
Start: 2022-04-30 | End: 2022-04-30

## 2022-04-30 RX ORDER — ENOXAPARIN SODIUM 100 MG/ML
40 INJECTION SUBCUTANEOUS
Status: DISCONTINUED | OUTPATIENT
Start: 2022-04-30 | End: 2022-05-04 | Stop reason: HOSPADM

## 2022-04-30 RX ORDER — OXYCODONE HYDROCHLORIDE AND ACETAMINOPHEN 5; 325 MG/1; MG/1
2 TABLET ORAL EVERY 4 HOURS PRN
Status: DISCONTINUED | OUTPATIENT
Start: 2022-04-30 | End: 2022-05-04 | Stop reason: HOSPADM

## 2022-04-30 RX ORDER — MAGNESIUM HYDROXIDE 1200 MG/15ML
LIQUID ORAL AS NEEDED
Status: DISCONTINUED | OUTPATIENT
Start: 2022-04-30 | End: 2022-04-30 | Stop reason: HOSPADM

## 2022-04-30 RX ADMIN — VANCOMYCIN HYDROCHLORIDE 1750 MG: 1 INJECTION, POWDER, LYOPHILIZED, FOR SOLUTION INTRAVENOUS at 10:26

## 2022-04-30 RX ADMIN — DEXMEDETOMIDINE HCL 8 MCG: 100 INJECTION INTRAVENOUS at 09:10

## 2022-04-30 RX ADMIN — VANCOMYCIN HYDROCHLORIDE 1250 MG: 1 INJECTION, POWDER, LYOPHILIZED, FOR SOLUTION INTRAVENOUS at 20:53

## 2022-04-30 RX ADMIN — SODIUM CHLORIDE 125 ML/HR: 0.9 INJECTION, SOLUTION INTRAVENOUS at 10:27

## 2022-04-30 RX ADMIN — SODIUM CHLORIDE 100 ML/HR: 0.9 INJECTION, SOLUTION INTRAVENOUS at 01:15

## 2022-04-30 RX ADMIN — KETAMINE HYDROCHLORIDE 50 MG: 50 INJECTION INTRAMUSCULAR; INTRAVENOUS at 08:50

## 2022-04-30 RX ADMIN — MORPHINE SULFATE 2 MG: 2 INJECTION, SOLUTION INTRAMUSCULAR; INTRAVENOUS at 06:24

## 2022-04-30 RX ADMIN — FENTANYL CITRATE 25 MCG: 50 INJECTION, SOLUTION INTRAMUSCULAR; INTRAVENOUS at 09:03

## 2022-04-30 RX ADMIN — CEFEPIME HYDROCHLORIDE 2000 MG: 2 INJECTION, SOLUTION INTRAVENOUS at 11:25

## 2022-04-30 RX ADMIN — ENOXAPARIN SODIUM 40 MG: 40 INJECTION SUBCUTANEOUS at 20:53

## 2022-04-30 RX ADMIN — ONDANSETRON 4 MG: 2 INJECTION INTRAMUSCULAR; INTRAVENOUS at 09:13

## 2022-04-30 RX ADMIN — LIDOCAINE HYDROCHLORIDE 50 MG: 10 INJECTION, SOLUTION EPIDURAL; INFILTRATION; INTRACAUDAL; PERINEURAL at 08:46

## 2022-04-30 RX ADMIN — OXYCODONE HYDROCHLORIDE AND ACETAMINOPHEN 2 TABLET: 5; 325 TABLET ORAL at 10:35

## 2022-04-30 RX ADMIN — MORPHINE SULFATE 2 MG: 2 INJECTION, SOLUTION INTRAMUSCULAR; INTRAVENOUS at 11:25

## 2022-04-30 RX ADMIN — HYDROMORPHONE HYDROCHLORIDE 0.5 MG: 1 INJECTION, SOLUTION INTRAMUSCULAR; INTRAVENOUS; SUBCUTANEOUS at 09:30

## 2022-04-30 RX ADMIN — FENTANYL CITRATE 25 MCG: 50 INJECTION, SOLUTION INTRAMUSCULAR; INTRAVENOUS at 09:06

## 2022-04-30 RX ADMIN — PROPOFOL 50 MG: 10 INJECTION, EMULSION INTRAVENOUS at 08:46

## 2022-04-30 RX ADMIN — SODIUM CHLORIDE 125 ML/HR: 0.9 INJECTION, SOLUTION INTRAVENOUS at 15:39

## 2022-04-30 RX ADMIN — CEFAZOLIN SODIUM 2000 MG: 2 SOLUTION INTRAVENOUS at 08:43

## 2022-04-30 RX ADMIN — CEFEPIME HYDROCHLORIDE 2000 MG: 2 INJECTION, SOLUTION INTRAVENOUS at 23:10

## 2022-04-30 RX ADMIN — PROPOFOL 20 MG: 10 INJECTION, EMULSION INTRAVENOUS at 08:48

## 2022-04-30 RX ADMIN — KETOROLAC TROMETHAMINE 30 MG: 30 INJECTION, SOLUTION INTRAMUSCULAR at 09:13

## 2022-04-30 RX ADMIN — MIDAZOLAM HYDROCHLORIDE 2 MG: 1 INJECTION, SOLUTION INTRAMUSCULAR; INTRAVENOUS at 08:43

## 2022-04-30 RX ADMIN — MORPHINE SULFATE 2 MG: 2 INJECTION, SOLUTION INTRAMUSCULAR; INTRAVENOUS at 19:38

## 2022-04-30 RX ADMIN — DEXMEDETOMIDINE HCL 12 MCG: 100 INJECTION INTRAVENOUS at 09:06

## 2022-04-30 RX ADMIN — DEXAMETHASONE SODIUM PHOSPHATE 5 MG: 10 INJECTION, SOLUTION INTRAMUSCULAR; INTRAVENOUS at 08:49

## 2022-04-30 RX ADMIN — FENTANYL CITRATE 50 MCG: 50 INJECTION, SOLUTION INTRAMUSCULAR; INTRAVENOUS at 08:46

## 2022-04-30 RX ADMIN — PROPOFOL 50 MG: 10 INJECTION, EMULSION INTRAVENOUS at 08:47

## 2022-04-30 NOTE — PROGRESS NOTES
Vancomycin Assessment    Merlinda Spiro is a 40 y o  male who is currently receiving vancomycin 1750 mg IV q 12 hrs for other Bone/Joint Infection  Relevant clinical data and objective history reviewed:  Creatinine   Date Value Ref Range Status   04/30/2022 0 96 0 60 - 1 30 mg/dL Final     Comment:     Standardized to IDMS reference method   04/29/2022 1 11 0 60 - 1 30 mg/dL Final     Comment:     Standardized to IDMS reference method     /75   Pulse 77   Temp 99 6 °F (37 6 °C)   Resp 17   Ht 6' (1 829 m)   Wt 84 kg (185 lb 3 oz)   SpO2 94%   BMI 25 12 kg/m²   I/O last 3 completed shifts: In: 48 [IV Piggyback:50]  Out: 325 [Urine:325]  Lab Results   Component Value Date/Time    BUN 14 04/30/2022 05:25 AM    WBC 15 91 (H) 04/30/2022 05:25 AM    HGB 13 8 04/30/2022 05:25 AM    HCT 42 1 04/30/2022 05:25 AM    MCV 97 04/30/2022 05:25 AM     04/30/2022 05:25 AM     Temp Readings from Last 3 Encounters:   04/30/22 99 6 °F (37 6 °C)   02/01/22 100 °F (37 8 °C) (Temporal)     Vancomycin Days of Therapy: 2    Assessment/Plan  The patient is currently on vancomycin utilizing scheduled dosing  Baseline risks associated with therapy include: pre-existing renal impairment and concomitant nephrotoxic medications  The patient is receiving 1750 mg IV q 12 hrs based on a goal of 15-20 (appropriate for most indications) ; however, after clinical evaluation will be changed to 1250 mg IV q 8 hrs   Pharmacy will continue to follow closely for s/sx of nephrotoxicity, infusion reactions, and appropriateness of therapy  BMP and CBC will be ordered per protocol  Plan for trough as patient approaches steady state, prior to the 5th  dose at approximately 1230 on 5/1/22  Pharmacy will continue to follow the patients culture results and clinical progress daily      Petra Hanson, Pharmacist

## 2022-04-30 NOTE — ASSESSMENT & PLAN NOTE
· Presents with right knee pain, swelling, and erythema that started 2 days ago  Denies any trauma or injury to the area  · Per ED provider, right knee tapped for 3cc fluid in ED  Synovial fluid analysis showed elevated WBC's 331  · , sed rate 24   · Afebrile, lactate normal   · ED provider discussed with orthopedics on call, recommending CT of right knee and IV antiobiotics  · Appreciate orthopedics consult  · F/U blood cultures , continue vancomycin and cefepime  ·  Check uric acid, procalcitonin, MRSA swab  Lyme titer pending     · CT right knee pending   · Pain control   · Consider possible septic joint   · Status post arthroscopy on 04/30/2022  · As per operating finding notes:Right knee aspirated prior to the operation aseptically 5 cc of blood-tinged fluid no evidence of purulence sent for separate cultures and Gram stain

## 2022-04-30 NOTE — ED NOTES
Knee aspiration performed by Dr Ese Mandel, 3mL clear yellow fluid collected        Veronique Saunders RN  04/29/22 2015

## 2022-04-30 NOTE — UTILIZATION REVIEW
Initial Clinical Review    Admission: Date/Time/Statement:   Admission Orders (From admission, onward)     Ordered        04/29/22 0338  Place in Observation  Once                      04/30/22 1216  Inpatient Admission  Once        Transfer Service: Hospitalist       Question Answer Comment   Level of Care Med Surg    Estimated length of stay More than 2 Midnights    Certification I certify that inpatient services are medically necessary for this patient for a duration of greater than two midnights  See H&P and MD Progress Notes for additional information about the patient's course of treatment  04/30/22 1215     OBSERVATION  4/29  @  2300 CHANGED TO IP ADMISSION  4/30 @  1215  The patient will continue to require additional inpatient hospital stay due to To monitor    Septic  bursitis    ED Arrival Information     Expected Arrival Acuity    - 4/29/2022 18:43 Urgent         Means of arrival Escorted by Service Admission type    Wheelchair Friend Hospitalist Urgent         Arrival complaint    rt knee swollen/pain        Chief Complaint   Patient presents with    Knee Pain     R knee pain x 2 days, denies injury, unable to bear weight  R knee is red and endematous  Initial Presentation: 40 y o  male presents to ED from home with right knee pain, swelling and erythema for past 2 days  Works  In general asael,  Denies  Any trauma or injury  No  PMH  Pain 5/10 on arrival   Denies  Numbness  Right knee aspirated in ED,  Showed  + WBC's in synovial fluid  Labs  Show  WBC   16,   ESR   24,   CRP   142 and  sodium  134  Admit  Observation with Effusion right knee, possible septic joint,  leukocytosis and hyponatremia and plan is  Monitor labs, blood cultures, pain control and possible  Surgical intervention           SURGERY DATE: 4/30/2022  Procedure(s) (LRB):  ARTHROSCOPY KNEE (Right)  Operative Findings:  Right knee aspirated prior to the operation aseptically 5 cc of blood-tinged fluid no evidence of purulence sent for separate cultures and Gram stain  Purulence from the prepatellar bursa incision drainage necrotic bursa under the fascia excised completely tendon intact bone intact wound left open after washout cultures obtained deep  approximately 4 feet of packing placed in the knee  It is to be replaced/repacked on Monday, then follow up for removal in the office Friday with Dr Walt Kang      ED Triage Vitals [04/29/22 1848]   Temperature Pulse Respirations Blood Pressure SpO2   99 1 °F (37 3 °C) 97 18 129/75 99 %      Temp Source Heart Rate Source Patient Position - Orthostatic VS BP Location FiO2 (%)   Temporal Monitor Lying Right arm --      Pain Score       10 - Worst Possible Pain          Wt Readings from Last 1 Encounters:   04/29/22 84 kg (185 lb 3 oz)     Additional Vital Signs:   98 7 °F (37 1 °C) 82  20 133/79 -- 96 % None (Room air) --    04/30/22 07:30:17 -- 82 18 122/79 93 93 % -- --   04/29/22 2343 -- -- -- -- -- -- None (Room air) --   04/29/22 23:40:54 -- 88 17 136/91 106 98 % -- --   04/29/22 2300 -- 94 19 123/83 93 99 % None (Room air) Sitting   04/29/22 2100 98 9 °F (37 2 °C) 97 17 124/78 94 96 % None (Room air) Sitting   04/29/22 1848 99 1 °F (37 3 °C) 97 18 129/75 -- 99 % None (Room air) Lying       Pertinent Labs/Diagnostic Test Results:   XR knee 3 views right non injury    (Results Pending)   CT lower extremity wo contrast right    (Results Pending)         Results from last 7 days   Lab Units 04/30/22  0525 04/29/22  0705   WBC Thousand/uL 15 91* 16 68*   HEMOGLOBIN g/dL 13 8 14 4   HEMATOCRIT % 42 1 43 3   PLATELETS Thousands/uL 325 330   NEUTROS ABS Thousands/µL 11 94* 13 34*         Results from last 7 days   Lab Units 04/30/22  0525 04/29/22  0705   SODIUM mmol/L 134* 134*   POTASSIUM mmol/L 3 8 4 1   CHLORIDE mmol/L 101 100   CO2 mmol/L 28 29   ANION GAP mmol/L 5 5   BUN mg/dL 14 11   CREATININE mg/dL 0 96 1 11   EGFR ml/min/1 73sq m 95 79   CALCIUM mg/dL 9 3 8 9 Results from last 7 days   Lab Units 04/30/22  0525   AST U/L 15   ALT U/L 35   ALK PHOS U/L 89   TOTAL PROTEIN g/dL 6 8   ALBUMIN g/dL 3 0*   TOTAL BILIRUBIN mg/dL 0 90         Results from last 7 days   Lab Units 04/30/22  0525 04/29/22  0705   GLUCOSE RANDOM mg/dL 97 143*                   Results from last 7 days   Lab Units 04/30/22  0525   PROTIME seconds 12 5   INR  0 94         Results from last 7 days   Lab Units 04/29/22  0705   PROCALCITONIN ng/ml 0 16     Results from last 7 days   Lab Units 04/29/22  1905   LACTIC ACID mmol/L 1 9                             Results from last 7 days   Lab Units 04/29/22  0705   CRP mg/L 142 4*   SED RATE mm/hour 28*               Results from last 7 days   Lab Units 04/29/22  1908   BLOOD CULTURE  Received in Microbiology Lab  Culture in Progress  Received in Microbiology Lab  Culture in Progress       Results from last 7 days   Lab Units 04/29/22 2015   TOTAL COUNTED  100   NEUTROPHIL % (SYNOVIAL) % 5   MONOCYTE % (SYNOVIAL) % 5   WBC FLUID /ul 331*           ED Treatment:   Medication Administration from 04/29/2022 1841 to 04/29/2022 2341       Date/Time Order Dose Route Action Comments     04/29/2022 1919 ketorolac (TORADOL) injection 15 mg 15 mg Intravenous Given      04/29/2022 1919 morphine (PF) 4 mg/mL injection 4 mg 4 mg Intravenous Given      04/29/2022 2013 morphine (PF) 4 mg/mL injection 4 mg 4 mg Intravenous Given      04/29/2022 2320 vancomycin (VANCOCIN) 1250 mg in sodium chloride 0 9% 250 mL IVPB 1,250 mg Intravenous New Bag      04/29/2022 2318 cefepime (MAXIPIME) IVPB (premix in dextrose) 2,000 mg 50 mL 0 mg Intravenous Stopped      04/29/2022 2246 cefepime (MAXIPIME) IVPB (premix in dextrose) 2,000 mg 50 mL 2,000 mg Intravenous New Bag         Admitting Diagnosis: Right knee pain [M25 561]  Effusion of right knee [M25 461]  Staphylococcal arthritis of right knee ([facility] [address] ) [M00 061]  Age/Sex: 40 y o  male  Admission Orders:  Scheduled Medications:  cefazolin, 2,000 mg, Intravenous, Once  cefepime, 2,000 mg, Intravenous, Q12H  chlorhexidine, 15 mL, Swish & Spit, Once  nicotine, 1 patch, Transdermal, Daily  [MAR Hold] vancomycin, 20 mg/kg, Intravenous, Q12H      Continuous IV Infusions:  sodium chloride, 100 mL/hr, Intravenous, Continuous      PRN Meds:  acetaminophen, 650 mg, Oral, Q6H PRN  [MAR Hold] chlorhexidine, , Topical, Daily PRN  morphine injection, 2 mg, Intravenous, Q4H PRN   ( x1  4/30 thus far)   ondansetron, 4 mg, Intravenous, Q6H PRN        IP CONSULT TO ORTHOPEDIC SURGERY  IP CONSULT TO INFECTIOUS DISEASES  IP CONSULT TO PHARMACY    Network Utilization Review Department  ATTENTION: Please call with any questions or concerns to [phone] and carefully listen to the prompts so that you are directed to the right person  All voicemails are confidential   Karlene Vann all requests for admission clinical reviews, approved or denied determinations and any other requests to dedicated fax number below belonging to the campus where the patient is receiving treatment   List of dedicated fax numbers for the Facilities:  [address] DENIALS (Administrative/Medical Necessity) [phone]   [address] (Maternity/NICU/Pediatrics) [phone]   [address]  [address] [facility] [place] [address] [address] [place] [address] [place] [address] [facility] [phone]

## 2022-04-30 NOTE — ANESTHESIA PREPROCEDURE EVALUATION
Procedure:  ARTHROSCOPY KNEE (Right Knee)    Relevant Problems   No relevant active problems   +smoker, methamphetamine use (last week, snort)  10py    Lab Results   Component Value Date    WBC 15 91 (H) 04/30/2022    HGB 13 8 04/30/2022    HCT 42 1 04/30/2022    MCV 97 04/30/2022     04/30/2022     Lab Results   Component Value Date    SODIUM 134 (L) 04/30/2022    K 3 8 04/30/2022     04/30/2022    CO2 28 04/30/2022    BUN 14 04/30/2022    CREATININE 0 96 04/30/2022    GLUC 97 04/30/2022    CALCIUM 9 3 04/30/2022              Physical Exam    Airway    Mallampati score: II  TM Distance: >3 FB  Neck ROM: full     Dental       Cardiovascular  Rhythm: regular, Rate: normal,     Pulmonary  Breath sounds clear to auscultation,     Other Findings        Anesthesia Plan  ASA Score- 2     Anesthesia Type- general with ASA Monitors  Additional Monitors:   Airway Plan: ETT  Comment: Risks/benefits and alternatives discussed with patient including possible PONV, sore throat, damage to teeth/lips/gums, and possibility of rare anesthetic and surgical emergencies including but not limited to heart attack, stroke, and/or death  All questions were answered          Plan Factors-Exercise tolerance (METS): >4 METS  Chart reviewed  Existing labs reviewed  Patient summary reviewed  Patient is a current smoker  Patient did not smoke on day of surgery  Induction- intravenous  Postoperative Plan- Plan for postoperative opioid use  Planned trial extubation    Informed Consent- Anesthetic plan and risks discussed with patient  I personally reviewed this patient with the CRNA  Discussed and agreed on the Anesthesia Plan with the CRNA  Laly Estrada

## 2022-04-30 NOTE — PLAN OF CARE
Problem: PAIN - ADULT  Goal: Verbalizes/displays adequate comfort level or baseline comfort level  Description: Interventions:  - Encourage patient to monitor pain and request assistance  - Assess pain using appropriate pain scale  - Administer analgesics based on type and severity of pain and evaluate response  - Implement non-pharmacological measures as appropriate and evaluate response  - Consider cultural and social influences on pain and pain management  - Notify physician/advanced practitioner if interventions unsuccessful or patient reports new pain  Outcome: Progressing     Problem: INFECTION - ADULT  Goal: Absence or prevention of progression during hospitalization  Description: INTERVENTIONS:  - Assess and monitor for signs and symptoms of infection  - Monitor lab/diagnostic results  - Monitor all insertion sites, i e  indwelling lines, tubes, and drains  - Monitor endotracheal if appropriate and nasal secretions for changes in amount and color  - Frederick appropriate cooling/warming therapies per order  - Administer medications as ordered  - Instruct and encourage patient and family to use good hand hygiene technique  - Identify and instruct in appropriate isolation precautions for identified infection/condition  Outcome: Progressing     Problem: SAFETY ADULT  Goal: Patient will remain free of falls  Description: INTERVENTIONS:  - Educate patient/family on patient safety including physical limitations  - Instruct patient to call for assistance with activity   - Consult OT/PT to assist with strengthening/mobility   - Keep Call bell within reach  - Keep bed low and locked with side rails adjusted as appropriate  - Keep care items and personal belongings within reach  - Initiate and maintain comfort rounds  - Make Fall Risk Sign visible to staff  - Apply yellow socks and bracelet for high fall risk patients  - Consider moving patient to room near nurses station  Outcome: Progressing  Goal: Maintain or return to baseline ADL function  Description: INTERVENTIONS:  -  Assess patient's ability to carry out ADLs; assess patient's baseline for ADL function and identify physical deficits which impact ability to perform ADLs (bathing, care of mouth/teeth, toileting, grooming, dressing, etc )  - Assess/evaluate cause of self-care deficits   - Assess range of motion  - Assess patient's mobility; develop plan if impaired  - Assess patient's need for assistive devices and provide as appropriate  - Encourage maximum independence but intervene and supervise when necessary  - Involve family in performance of ADLs  - Assess for home care needs following discharge   - Consider OT consult to assist with ADL evaluation and planning for discharge  - Provide patient education as appropriate  Outcome: Progressing  Goal: Maintains/Returns to pre admission functional level  Description: INTERVENTIONS:  - Perform BMAT or MOVE assessment daily    - Set and communicate daily mobility goal to care team and patient/family/caregiver     - Collaborate with rehabilitation services on mobility goals if consulted  - Out of bed for toileting  - Record patient progress and toleration of activity level   Outcome: Progressing     Problem: DISCHARGE PLANNING  Goal: Discharge to home or other facility with appropriate resources  Description: INTERVENTIONS:  - Identify barriers to discharge w/patient and caregiver  - Arrange for needed discharge resources and transportation as appropriate  - Identify discharge learning needs (meds, wound care, etc )  - Arrange for interpretive services to assist at discharge as needed  - Refer to Case Management Department for coordinating discharge planning if the patient needs post-hospital services based on physician/advanced practitioner order or complex needs related to functional status, cognitive ability, or social support system  Outcome: Progressing     Problem: Knowledge Deficit  Goal: Patient/family/caregiver demonstrates understanding of disease process, treatment plan, medications, and discharge instructions  Description: Complete learning assessment and assess knowledge base    Interventions:  - Provide teaching at level of understanding  - Provide teaching via preferred learning methods  Outcome: Progressing

## 2022-04-30 NOTE — PROGRESS NOTES
[address]  Progress Note - Maggie Lies 1975, 40 y o  male MRN: [id number]  Unit/Bed#: [facility] Encounter: [id number]  Primary Care Provider: No primary care provider on file  Date and time admitted to hospital: 4/29/2022  6:47 PM    * Prepatellar bursitis of right knee  Assessment & Plan  Septic bursitis  Status post arthroscopy with orthopedics  As per operative finding notes:Purulence from the prepatellar bursa incision drainage necrotic bursa under the fascia excised completely tendon intact bone intact wound left open after washout cultures obtained deep  approximately 4 feet of packing placed in the knee  It is to be replaced/repacked on Monday, then follow up for removal in the office Friday with Dr Tona Arroyo  Follow labs, ID consult    Hyponatremia  Assessment & Plan  · Na 134   · Suspect due to hypovolemia  · Trend BMP    Leukocytosis  Assessment & Plan  · WBC 16 on admission   · Suspected source of infection right knee cellulitis vs septic joint/effusion  · F/u blood cultures, continue broad spectrum antibiotics     Effusion of right knee  Assessment & Plan  · Presents with right knee pain, swelling, and erythema that started 2 days ago  Denies any trauma or injury to the area  · Per ED provider, right knee tapped for 3cc fluid in ED  Synovial fluid analysis showed elevated WBC's 331  · , sed rate 24   · Afebrile, lactate normal   · ED provider discussed with orthopedics on call, recommending CT of right knee and IV antiobiotics  · Appreciate orthopedics consult  · F/U blood cultures , continue vancomycin and cefepime  ·  Check uric acid, procalcitonin, MRSA swab  Lyme titer pending     · CT right knee pending   · Pain control   · Consider possible septic joint   · Status post arthroscopy on 04/30/2022  · As per operating finding notes:Right knee aspirated prior to the operation aseptically 5 cc of blood-tinged fluid no evidence of purulence sent for separate cultures and Gram stain            VTE Pharmacologic Prophylaxis: VTE Score: 2 Moderate Risk (Score 3-4) - Pharmacological DVT Prophylaxis Ordered: enoxaparin (Lovenox)  Patient Centered Rounds: I performed bedside rounds with nursing staff today  Discussions with Specialists or Other Care Team Provider:  Orthopedic    Education and Discussions with Family / Patient: With patient  Time Spent for Care: 15 minutes  More than 50% of total time spent on counseling and coordination of care as described above  Current Length of Stay: 0 day(s)  Current Patient Status: Inpatient   Certification Statement: The patient will continue to require additional inpatient hospital stay due to To monitor above condition  Discharge Plan: Anticipate discharge in 48-72 hrs to To be determined    Code Status: Level 1 - Full Code    Subjective:   Seen and evaluated during the round  Patient just came back from or after washout with orthopedics  Still complaining of pain on right knee  Denies any nausea, vomiting, fever  Denies any recent trauma, any previous history of infection or any IV drug abuse  Objective:     Vitals:   Temp (24hrs), Av 7 °F (37 1 °C), Min:97 9 °F (36 6 °C), Max:99 6 °F (37 6 °C)    Temp:  [97 9 °F (36 6 °C)-99 6 °F (37 6 °C)] 99 6 °F (37 6 °C)  HR:  [71-97] 77  Resp:  [12-20] 17  BP: (120-136)/(75-91) 124/75  SpO2:  [92 %-100 %] 94 %  Body mass index is 25 12 kg/m²  Input and Output Summary (last 24 hours): Intake/Output Summary (Last 24 hours) at 2022 1225  Last data filed at 2022 0954  Gross per 24 hour   Intake 600 ml   Output 325 ml   Net 275 ml       Physical Exam:   Physical Exam  Vitals and nursing note reviewed  Constitutional:       General: He is not in acute distress  Appearance: Normal appearance  He is not ill-appearing, toxic-appearing or diaphoretic  HENT:      Head: Normocephalic        Mouth/Throat:      Mouth: Mucous membranes are moist       Pharynx: Oropharynx is clear  No oropharyngeal exudate or posterior oropharyngeal erythema  Eyes:      General: No scleral icterus  Extraocular Movements: Extraocular movements intact  Conjunctiva/sclera: Conjunctivae normal       Pupils: Pupils are equal, round, and reactive to light  Neck:      Vascular: No carotid bruit  Cardiovascular:      Rate and Rhythm: Normal rate  Heart sounds: No murmur heard  No friction rub  No gallop  Pulmonary:      Effort: Pulmonary effort is normal  No respiratory distress  Breath sounds: No stridor  No wheezing or rhonchi  Abdominal:      General: Abdomen is flat  Bowel sounds are normal  There is no distension  Palpations: There is no mass  Tenderness: There is no abdominal tenderness  Hernia: No hernia is present  Musculoskeletal:         General: Tenderness (Right knee) present  Cervical back: Normal range of motion and neck supple  No tenderness  Right lower leg: No edema  Left lower leg: No edema  Comments: Right knee is wrapped with Ace compression bandage, clean, dry, intact   Lymphadenopathy:      Cervical: No cervical adenopathy  Skin:     General: Skin is warm  Capillary Refill: Capillary refill takes less than 2 seconds  Neurological:      General: No focal deficit present  Mental Status: He is alert and oriented to person, place, and time  Cranial Nerves: No cranial nerve deficit  Sensory: No sensory deficit  Motor: No weakness        Coordination: Coordination normal           Additional Data:     Labs:  Results from last 7 days   Lab Units 04/30/22  0525   WBC Thousand/uL 15 91*   HEMOGLOBIN g/dL 13 8   HEMATOCRIT % 42 1   PLATELETS Thousands/uL 325   NEUTROS PCT % 76*   LYMPHS PCT % 12*   MONOS PCT % 11   EOS PCT % 1     Results from last 7 days   Lab Units 04/30/22  0525   SODIUM mmol/L 134*   POTASSIUM mmol/L 3 8   CHLORIDE mmol/L 101   CO2 mmol/L 28   BUN mg/dL 14   CREATININE mg/dL 0 96   ANION GAP mmol/L 5   CALCIUM mg/dL 9 3   ALBUMIN g/dL 3 0*   TOTAL BILIRUBIN mg/dL 0 90   ALK PHOS U/L 89   ALT U/L 35   AST U/L 15   GLUCOSE RANDOM mg/dL 97     Results from last 7 days   Lab Units 04/30/22  0525   INR  0 94             Results from last 7 days   Lab Units 04/29/22  1905 04/29/22  0705   LACTIC ACID mmol/L 1 9  --    PROCALCITONIN ng/ml  --  0 16       Lines/Drains:  Invasive Devices  Report    Peripheral Intravenous Line            Peripheral IV 04/29/22 Right Antecubital 1 day    Peripheral IV 04/30/22 Right;Ventral (anterior) Forearm <1 day                      Imaging: Reviewed radiology reports from this admission including: CT scan of lower extremity    Recent Cultures (last 7 days):   Results from last 7 days   Lab Units 04/29/22  1908   BLOOD CULTURE  Received in Microbiology Lab  Culture in Progress  Received in Microbiology Lab  Culture in Progress         Last 24 Hours Medication List:   Current Facility-Administered Medications   Medication Dose Route Frequency Provider Last Rate    acetaminophen  650 mg Oral Q6H PRN Nubia Bonilla MD      cefepime  2,000 mg Intravenous Q12H Nubia Bonilla MD 2,000 mg (04/30/22 1125)    enoxaparin  40 mg Subcutaneous Q24H [address] Nubia Bonilla MD      HYDROmorphone  0 2 mg Intravenous Q4H PRN Daksha Donnelly MD      morphine injection  2 mg Intravenous Q4H PRN Nubia Bonilla MD      naloxone  0 04 mg Intravenous Q1MIN PRN Daksha Donnelly MD      nicotine  1 patch Transdermal Daily Nubia Bonilla MD      ondansetron  4 mg Intravenous Q6H PRN Nubia Bonilla MD      oxyCODONE-acetaminophen  2 tablet Oral Q4H PRN Nubia Bonilla MD      sodium chloride  125 mL/hr Intravenous Continuous Monserrat Popper, CRNA 125 mL/hr (04/30/22 1027)    vancomycin  20 mg/kg Intravenous Q12H Nubia Bonilla MD 1,750 mg (04/30/22 1026)        Today, Patient Was Seen By: Daksha Donnelly MD    **Please Note: This note may have been constructed using a voice recognition system  **

## 2022-04-30 NOTE — H&P
/79   Pulse 82 Comment: sinus rhythm  Temp 98 7 °F (37 1 °C) (Oral)   Resp 20   Ht 6' (1 829 m)   Wt 84 kg (185 lb 3 oz)   SpO2 96%   BMI 25 12 kg/m²    Prior history and physical reviewed  Performed within last 30 days  No update from prior  Will proceed to OR as planned    ID r prepatellar bursa planned

## 2022-04-30 NOTE — PLAN OF CARE
Problem: PAIN - ADULT  Goal: Verbalizes/displays adequate comfort level or baseline comfort level  Description: Interventions:  - Encourage patient to monitor pain and request assistance  - Assess pain using appropriate pain scale  - Administer analgesics based on type and severity of pain and evaluate response  - Implement non-pharmacological measures as appropriate and evaluate response  - Consider cultural and social influences on pain and pain management  - Notify physician/advanced practitioner if interventions unsuccessful or patient reports new pain  Outcome: Progressing     Problem: INFECTION - ADULT  Goal: Absence or prevention of progression during hospitalization  Description: INTERVENTIONS:  - Assess and monitor for signs and symptoms of infection  - Monitor lab/diagnostic results  - Monitor all insertion sites, i e  indwelling lines, tubes, and drains  - Monitor endotracheal if appropriate and nasal secretions for changes in amount and color  - Mongo appropriate cooling/warming therapies per order  - Administer medications as ordered  - Instruct and encourage patient and family to use good hand hygiene technique  - Identify and instruct in appropriate isolation precautions for identified infection/condition  Outcome: Progressing     Problem: SAFETY ADULT  Goal: Patient will remain free of falls  Description: INTERVENTIONS:  - Educate patient/family on patient safety including physical limitations  - Instruct patient to call for assistance with activity   - Consult OT/PT to assist with strengthening/mobility   - Keep Call bell within reach  - Keep bed low and locked with side rails adjusted as appropriate  - Keep care items and personal belongings within reach  - Initiate and maintain comfort rounds  - Make Fall Risk Sign visible to staff  - Offer Toileting every 2 Hours, in advance of need  - Initiate/Maintain bed/chair alarm  - Apply yellow socks and bracelet for high fall risk patients  - Consider moving patient to room near nurses station  Outcome: Progressing  Goal: Maintain or return to baseline ADL function  Description: INTERVENTIONS:  -  Assess patient's ability to carry out ADLs; assess patient's baseline for ADL function and identify physical deficits which impact ability to perform ADLs (bathing, care of mouth/teeth, toileting, grooming, dressing, etc )  - Assess/evaluate cause of self-care deficits   - Assess range of motion  - Assess patient's mobility; develop plan if impaired  - Assess patient's need for assistive devices and provide as appropriate  - Encourage maximum independence but intervene and supervise when necessary  - Involve family in performance of ADLs  - Assess for home care needs following discharge   - Consider OT consult to assist with ADL evaluation and planning for discharge  - Provide patient education as appropriate  Outcome: Progressing  Goal: Maintains/Returns to pre admission functional level  Description: INTERVENTIONS:  - Perform BMAT or MOVE assessment daily    - Set and communicate daily mobility goal to care team and patient/family/caregiver     - Collaborate with rehabilitation services on mobility goals if consulted  - Stand patient 2 times a day  - Out of bed to chair 3 times a day   - Out of bed for meals 3 times a day  - Out of bed for toileting  - Record patient progress and toleration of activity level   Outcome: Progressing     Problem: DISCHARGE PLANNING  Goal: Discharge to home or other facility with appropriate resources  Description: INTERVENTIONS:  - Identify barriers to discharge w/patient and caregiver  - Arrange for needed discharge resources and transportation as appropriate  - Identify discharge learning needs (meds, wound care, etc )  - Arrange for interpretive services to assist at discharge as needed  - Refer to Case Management Department for coordinating discharge planning if the patient needs post-hospital services based on physician/advanced practitioner order or complex needs related to functional status, cognitive ability, or social support system  Outcome: Progressing     Problem: Knowledge Deficit  Goal: Patient/family/caregiver demonstrates understanding of disease process, treatment plan, medications, and discharge instructions  Description: Complete learning assessment and assess knowledge base  Interventions:  - Provide teaching at level of understanding  - Provide teaching via preferred learning methods  Outcome: Progressing     Problem: MOBILITY - ADULT  Goal: Maintain or return to baseline ADL function  Description: INTERVENTIONS:  -  Assess patient's ability to carry out ADLs; assess patient's baseline for ADL function and identify physical deficits which impact ability to perform ADLs (bathing, care of mouth/teeth, toileting, grooming, dressing, etc )  - Assess/evaluate cause of self-care deficits   - Assess range of motion  - Assess patient's mobility; develop plan if impaired  - Assess patient's need for assistive devices and provide as appropriate  - Encourage maximum independence but intervene and supervise when necessary  - Involve family in performance of ADLs  - Assess for home care needs following discharge   - Consider OT consult to assist with ADL evaluation and planning for discharge  - Provide patient education as appropriate  Outcome: Progressing  Goal: Maintains/Returns to pre admission functional level  Description: INTERVENTIONS:  - Perform BMAT or MOVE assessment daily    - Set and communicate daily mobility goal to care team and patient/family/caregiver     - Collaborate with rehabilitation services on mobility goals if consulted  - Stand patient 2 times a day  - Out of bed to chair 3 times a day   - Out of bed for meals 3 times a day  - Out of bed for toileting  - Record patient progress and toleration of activity level   Outcome: Progressing     Problem: Potential for Falls  Goal: Patient will remain free of falls  Description: INTERVENTIONS:  - Educate patient/family on patient safety including physical limitations  - Instruct patient to call for assistance with activity   - Consult OT/PT to assist with strengthening/mobility   - Keep Call bell within reach  - Keep bed low and locked with side rails adjusted as appropriate  - Keep care items and personal belongings within reach  - Initiate and maintain comfort rounds  - Make Fall Risk Sign visible to staff  - Offer Toileting every 2 Hours, in advance of need  - Initiate/Maintain bed/chair alarm  - Apply yellow socks and bracelet for high fall risk patients  - Consider moving patient to room near nurses station  Outcome: Progressing     Problem: SKIN/TISSUE INTEGRITY - ADULT  Goal: Incision(s), wounds(s) or drain site(s) healing without S/S of infection  Description: INTERVENTIONS  - Assess and document dressing, incision, wound bed, drain sites and surrounding tissue  - Provide patient and family education  Outcome: Progressing     Problem: MUSCULOSKELETAL - ADULT  Goal: Maintain or return mobility to safest level of function  Description: INTERVENTIONS:  - Assess patient's ability to carry out ADLs; assess patient's baseline for ADL function and identify physical deficits which impact ability to perform ADLs (bathing, care of mouth/teeth, toileting, grooming, dressing, etc )  - Assess/evaluate cause of self-care deficits   - Assess range of motion  - Assess patient's mobility  - Assess patient's need for assistive devices and provide as appropriate  - Encourage maximum independence but intervene and supervise when necessary  - Involve family in performance of ADLs  - Assess for home care needs following discharge   - Consider OT consult to assist with ADL evaluation and planning for discharge  - Provide patient education as appropriate  Outcome: Progressing

## 2022-04-30 NOTE — CONSULTS
Orthopedics   Janice Carmona 40 y o  male MRN: [id number]  Unit/Bed#: [facility] OP ROOM      Chief Complaint:   right knee pain    HPI:   40 y o male complaining of right knee pain for 2 days with pain swelling redness and warmth  Patient does use methamphetamines but reports he "does not shoot up"  He occupation is black mold removal   Sometimes he does wear knee pads for certain jobs  He denies known etiology as to the cause of his symptoms  Denies any other myalgias or arthralgias  ER workup WBC elevation is 16 68, sed rate elevated to 28, CRP elevation of 142 4  Patient underwent aspiration by the ER staff which they reported as intra-articular noted synovial WBC elevated to 331  Fluid culture, Lyme test, uric acid levels pending  Patient was started on IV antibiotics  Today's CBC notes WBC of 15 91  Review Of Systems:   · Skin: Normal  · Neuro: See HPI  · Musculoskeletal: See HPI  · 14 point review of systems negative except as stated above     Past Medical History:   History reviewed  No pertinent past medical history  Past Surgical History:   History reviewed  No pertinent surgical history  Family History:  Family history reviewed and non-contributory  History reviewed  No pertinent family history      Social History:  Social History     Socioeconomic History    Marital status: Single     Spouse name: None    Number of children: None    Years of education: None    Highest education level: None   Occupational History    None   Tobacco Use    Smoking status: Current Every Day Smoker     Packs/day: 1 00     Types: Cigarettes    Smokeless tobacco: Never Used   Substance and Sexual Activity    Alcohol use: Never    Drug use: Not Currently     Types: Methamphetamines     Comment: taking methamphetamines for pain control    Sexual activity: None   Other Topics Concern    None   Social History Narrative    None     Social Determinants of Health     Financial Resource Strain: Not on file   Food Insecurity: Not on file   Transportation Needs: Not on file   Physical Activity: Not on file   Stress: Not on file   Social Connections: Not on file   Intimate Partner Violence: Not on file   Housing Stability: Not on file       Allergies:   No Known Allergies        Labs:  0   Lab Value Date/Time    HCT 42 1 04/30/2022 0525    HCT 43 3 04/29/2022 0705    HGB 13 8 04/30/2022 0525    HGB 14 4 04/29/2022 0705    INR 0 94 04/30/2022 0525    WBC 15 91 (H) 04/30/2022 0525    WBC 16 68 (H) 04/29/2022 0705    ESR 28 (H) 04/29/2022 0705     4 (H) 04/29/2022 0705       Meds:    Current Facility-Administered Medications:     acetaminophen (TYLENOL) tablet 650 mg, 650 mg, Oral, Q6H PRN, MELISSA Medina    ceFAZolin (ANCEF) IVPB (premix in dextrose) 2,000 mg 50 mL, 2,000 mg, Intravenous, Once, Caroline Cano MD    cefepime (MAXIPIME) IVPB (premix in dextrose) 2,000 mg 50 mL, 2,000 mg, Intravenous, Q12H, MELISSA Medina    [facility] Hold] chlorhexidine (HIBICLENS) 4 % topical liquid, , Topical, Daily PRN, Caroline Cano MD    chlorhexidine (PERIDEX) 0 12 % oral rinse 15 mL, 15 mL, Swish & Spit, Once, Caroline Cano MD    morphine injection 2 mg, 2 mg, Intravenous, Q4H PRN, MELISSA Medina, 2 mg at 04/30/22 9510    nicotine (NICODERM CQ) 14 mg/24hr TD 24 hr patch 1 patch, 1 patch, Transdermal, Daily, MELISSA Medina    ondansetron [facility]) injection 4 mg, 4 mg, Intravenous, Q6H PRN, MELISSA Medina    sodium chloride 0 9 % infusion, 100 mL/hr, Intravenous, Continuous, MELISSA Medina, Last Rate: 100 mL/hr at 04/30/22 0115, 100 mL/hr at 04/30/22 0115    [MAR Hold] vancomycin (VANCOCIN) 1,750 mg in sodium chloride 0 9 % 500 mL IVPB, 20 mg/kg, Intravenous, Q12H, MELISSA Medina    Blood Culture:   Lab Results   Component Value Date    BLOODCX Received in Microbiology Lab  Culture in Progress  04/29/2022    BLOODCX Received in Microbiology Lab  Culture in Progress  04/29/2022       Wound Culture:    No results found for: WOUNDCULT    Ins and Outs:  I/O last 24 hours: In: 48 [IV Piggyback:50]  Out: 325 [Urine:325]          Physical Exam:   /79   Pulse 82 Comment: sinus rhythm  Temp 98 7 °F (37 1 °C) (Oral)   Resp 20   Ht 6' (1 829 m)   Wt 84 kg (185 lb 3 oz)   SpO2 96%   BMI 25 12 kg/m²   Gen: Alert and oriented to person, place, time  HEENT: EOMI, eyes clear, moist mucus membranes, hearing intact  Respiratory: Bilateral chest rise  No audible wheezing found  Cardiovascular: Regular Rate and Rhythm  Abdomen: soft nontender/nondistended  Musculoskeletal: right lower extremity  · Skin with diffuse anterior erythema and swelling  There are 2 puncture sites from needle aspiration by ER staff superior medial and superior lateral   · Tender to palpation over anterior knee  · Positive trace effusion with pre patella bursal thickening  · Can perform straight leg raise  · Stable to varus/valgus stress  · Sensation intact L3-S1  · 0-10° passive motion essentially pain-free, however increased pain with active knee flexion and anterior palpation  · There is no lymphangitic streaking  Pain-free at the hip with log-rolling of the extremity without erythema at the hip  There is no erythema at the right ankle and has pain-free plantar flexion dorsiflexion  ·   2+ DP pulse    Radiology:   I personally reviewed the films  X-rays right knee shows no acute osseous abnormality with prepatellar soft tissue swelling  CT results note anterior cellulitis and possible prepatellar bursitis with no radiopaque foreign body  No significant joint effusion or bony abnormality  Assessment:  40 y o  male with right knee septic prepatellar bursitis with questionable intra-articular involvement  Plan:   · Patient will go to the operating room to undergo pre patella I and D and possible arthroscopic washout of joint  · Continue antibiotics    · Patient evaluated by Dr Rupa Moulton at the bedside and consents obtained and right lower extremity marked      CATHIE WoodruffC

## 2022-04-30 NOTE — ASSESSMENT & PLAN NOTE
· Presents with right knee pain, swelling, and erythema that started 2 days ago  Denies any trauma or injury to the area  · Per ED provider, right knee tapped for 3cc fluid in ED  Synovial fluid analysis showed elevated WBC's 331  · , sed rate 24   · Afebrile, lactate normal   · ED provider discussed with orthopedics on call, recommending CT of right knee and IV antiobiotics  · Appreciate orthopedics consult  · F/U blood cultures , continue vancomycin and cefepime  ·  Check uric acid, procalcitonin, MRSA swab  Lyme titer pending     · CT right knee pending   · Pain control   · Consider possible septic joint   · NPO after midnight , possible ortho intervention

## 2022-04-30 NOTE — OP NOTE
OPERATIVE REPORT  PATIENT NAME: Ben Wilde    :  1975  MRN: [id number]  Pt Location: [facility] OR ROOM 02    SURGERY DATE: 2022    Surgeon(s) and Role:     * Jose Garcia MD - Primary     * Renuka Hemphill PA-C - Assisting no qualified resident available physician assistant medically necessary perform excision of infected prepatellar bursa    Preop Diagnosis:  Effusion of right knee [M25 461]  Staphylococcal arthritis of right knee ([facility] [address] ) [M00 061]    Post-Op Diagnosis Codes:     * Effusion of right knee [M25 461]     * infected right prepatellar bursa excised    Procedure(s) (LRB):  ARTHROSCOPY KNEE (Right)    Specimen(s):  ID Type Source Tests Collected by Time Destination   1 : right knee  Tissue Joint, Right Knee TISSUE EXAM Jose Garcia MD 2022 0911    A :  Synovial Fluid Joint, Knee ANAEROBIC CULTURE AND GRAM STAIN, BODY FLUID CULTURE AND GRAM STAIN Jose Garcia MD 2022 2679    B : right knee tissue cultures Tissue Joint, Right Knee ANAEROBIC CULTURE AND GRAM STAIN, CULTURE, TISSUE AND GRAM STAIN Jose Garcia MD 2022 0742    C : right knee aspirate Synovial Fluid Joint, Right Knee ANAEROBIC CULTURE AND GRAM STAIN, BODY FLUID CULTURE AND GRAM STAIN Jose Garcia MD 2022 0908        Estimated Blood Loss:   Minimal    Drains:  * No LDAs found *    Anesthesia Type:   General    Operative Indications:  Effusion of right knee [M25 461]  Infected right prepatellar bursa   Effusion in the right knee joint aspirated 5 cc of clear blood tinged fluid    Operative Findings:  Right knee aspirated prior to the operation aseptically 5 cc of blood-tinged fluid no evidence of purulence sent for separate cultures and Gram stain  Purulence from the prepatellar bursa incision drainage necrotic bursa under the fascia excised completely tendon intact bone intact wound left open after washout cultures obtained deep    Complications:   None    Procedure and Technique:   All treatment options were discussed with the patient including non-operative and operative treatment options  Patient has failed non-perative treatment and has opted for surgical intervention  Risks, complications and benefits of all treatment options were discussed in detail  The risks of surgical intervention including infection, injury to vessels and nerves  risk of failure to achieve desired results, risk of need for further procedures, potential risk of loss of life and limb were discussed with the patient  Informed consent was obtained from the patient  The operative site was marked and signed  A timeout was performed prior to the procedure  The patient was re-identified ,including name, date of birth, procedure, consent form reviewed, site and laterality  Appropriate antibiotics were administered preoperatively    The Physician Assistant was present for the entire case and provided essential assistance with patient positioning, prep and draping, wound closure, sterile dressing and splint application, all under my direct supervision(there was no resident available to assist with this case)    Patient was placed supine on the operating table with all bony problems well-padded tourniquet was used for the procedure      Initially and aseptic precautions from the non inflamed side the knee was aspirated 5 cc of blood-tinged fluid was obtained did not look purulent or infected this was sent  For cultures we then made a longitudinal incision over the most prominent aspect of the red indurated tissue purulent material obtained from the prepatellar bursa this was sent for cultures there was necrotic material in the prepatellar bursa this was excised excision and debridement included skin subcutaneous tissue fascia intact tendon and muscle final area of debridement measured around 6 cm in length 2 cm in width with around cm depth  Entire necrotic bursa excised copious Pulsavac carried out wound left open packed  Purulent material sent for Gram stain and culture the necrotic bursa also sent for pathology and cultures I was present for the entire procedure    Patient Disposition:  PACU       SIGNATURE: Geovanny Sosa MD  DATE: April 30, 2022  TIME: 9:15 AM    Patient allowed to weightbear as tolerated in the knee immobilizer   Packing can be removed after 48 hours  If patient is stable he can be discharged home on p o   Antibiotics Monday, follow-up in my office next week for wound inspection, patient needs to stay in the knee immobilizer till seen in the office   aspirin for DVT prophylaxis once discharged aspirin 325 mg enteric-coated once daily for 30 days

## 2022-04-30 NOTE — ASSESSMENT & PLAN NOTE
Septic bursitis  Status post arthroscopy with orthopedics  As per operative finding notes:Purulence from the prepatellar bursa incision drainage necrotic bursa under the fascia excised completely tendon intact bone intact wound left open after washout cultures obtained deep  approximately 4 feet of packing placed in the knee  It is to be replaced/repacked on Monday, then follow up for removal in the office Friday with Dr Romayne Shouts      Follow labs, ID consult

## 2022-04-30 NOTE — H&P
[address]  H&P- Sally Hairston 1975, 40 y o  male MRN: [id number]  Unit/Bed#: -01 Encounter: [id number]  Primary Care Provider: No primary care provider on file  Date and time admitted to hospital: 4/29/2022  6:47 PM    * Effusion of right knee  Assessment & Plan  · Presents with right knee pain, swelling, and erythema that started 2 days ago  Denies any trauma or injury to the area  · Per ED provider, right knee tapped for 3cc fluid in ED  Synovial fluid analysis showed elevated WBC's 331  · , sed rate 24   · Afebrile, lactate normal   · ED provider discussed with orthopedics on call, recommending CT of right knee and IV antiobiotics  · Appreciate orthopedics consult  · F/U blood cultures , continue vancomycin and cefepime  ·  Check uric acid, procalcitonin, MRSA swab  Lyme titer pending  · CT right knee pending   · Pain control   · Consider possible septic joint   · NPO after midnight , possible ortho intervention           Hyponatremia  Assessment & Plan  · Na 134   · Suspect due to hypovolemia  · Trend BMP    Leukocytosis  Assessment & Plan  · WBC 16 on admission   · Suspected source of infection right knee cellulitis vs septic joint/effusion  · F/u blood cultures, continue broad spectrum antibiotics     VTE Pharmacologic Prophylaxis: VTE Score: 2 Low Risk (Score 0-2) - Encourage Ambulation  Code Status: Level 1 - Full Code   Discussion with family: Patient declined call to   Anticipated Length of Stay: Patient will be admitted on an observation basis with an anticipated length of stay of less than 2 midnights secondary to right knee effusion - iv abx  Total Time for Visit, including Counseling / Coordination of Care: 70 minutes Greater than 50% of this total time spent on direct patient counseling and coordination of care      Chief Complaint: right knee pain, swelling, erythema     History of Present Illness:  Sally Hairston is a 40 y o  male with no prior PMH who presents with right knee pain, swelling, erythema that started 2 days ago  Works in general asael, 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  No prior episodes in the past  Reports pain is 5 out of 10, improved from intial presentation  Denies numbness or loss of sensation  Denies fevers, chills, sob, cp, abdominal complaints  In ED, underwent aspiration of right knee by ED provider  3cc removed which showed +WBCs in synovial fluid  Ed provider discussed case with orthopedics on call, will keep NPO for possible procedure in AM  Continue IV antibiotics  Review of Systems:  Review of Systems   Constitutional: Positive for activity change  Negative for chills, diaphoresis, fatigue and fever  Respiratory: Negative for cough and shortness of breath  Cardiovascular: Negative for chest pain and palpitations  Gastrointestinal: Negative for abdominal pain, constipation, diarrhea and nausea  Genitourinary: Negative for difficulty urinating  Musculoskeletal: Positive for arthralgias and joint swelling  Skin: Positive for color change  Neurological: Negative for dizziness and headaches  All other systems reviewed and are negative  Past Medical and Surgical History:   History reviewed  No pertinent past medical history  History reviewed  No pertinent surgical history  Meds/Allergies:  Prior to Admission medications    Not on File     I have reviewed home medications with patient personally   none    Allergies: No Known Allergies    Social History:  Marital Status: Single   Patient Pre-hospital Living Situation: Home  Patient Pre-hospital Level of Mobility: walks  Patient Pre-hospital Diet Restrictions: none  Substance Use History:   Social History     Substance and Sexual Activity   Alcohol Use Never     Social History     Tobacco Use   Smoking Status Current Every Day Smoker    Packs/day: 1 00    Types: Cigarettes   Smokeless Tobacco Never Used     Social History     Substance and Sexual Activity   Drug Use Not Currently    Types: Methamphetamines    Comment: taking methamphetamines for pain control       Family History:  History reviewed  No pertinent family history  Physical Exam:     Vitals:   Blood Pressure: 136/91 (04/29/22 2340)  Pulse: 88 (04/29/22 2340)  Temperature: 98 9 °F (37 2 °C) (04/29/22 2100)  Temp Source: Temporal (04/29/22 2100)  Respirations: 17 (04/29/22 2340)  Height: 6' (182 9 cm) (04/29/22 2340)  Weight - Scale: 84 kg (185 lb 3 oz) (04/29/22 2340)  SpO2: 98 % (04/29/22 2340)    Physical Exam  Vitals and nursing note reviewed  Constitutional:       General: He is not in acute distress  Appearance: Normal appearance  He is not ill-appearing or toxic-appearing  HENT:      Head: Normocephalic and atraumatic  Eyes:      Extraocular Movements: Extraocular movements intact  Pupils: Pupils are equal, round, and reactive to light  Cardiovascular:      Rate and Rhythm: Normal rate and regular rhythm  Pulses: Normal pulses  Heart sounds: Normal heart sounds  Pulmonary:      Effort: Pulmonary effort is normal       Breath sounds: Normal breath sounds  Abdominal:      General: Bowel sounds are normal  There is no distension  Palpations: Abdomen is soft  Tenderness: There is no abdominal tenderness  Musculoskeletal:         General: Swelling and tenderness present  Cervical back: Normal range of motion and neck supple  Right lower leg: Edema present  Skin:     General: Skin is warm  Findings: Erythema present  Neurological:      General: No focal deficit present  Mental Status: He is alert            Additional Data:     Lab Results:  Results from last 7 days   Lab Units 04/29/22  0705   WBC Thousand/uL 16 68*   HEMOGLOBIN g/dL 14 4   HEMATOCRIT % 43 3   PLATELETS Thousands/uL 330   NEUTROS PCT % 79*   LYMPHS PCT % 11*   MONOS PCT % 8   EOS PCT % 1     Results from last 7 days   Lab Units 04/29/22  0705   SODIUM mmol/L 134*   POTASSIUM mmol/L 4 1   CHLORIDE mmol/L 100   CO2 mmol/L 29   BUN mg/dL 11   CREATININE mg/dL 1 11   ANION GAP mmol/L 5   CALCIUM mg/dL 8 9   GLUCOSE RANDOM mg/dL 143*                 Results from last 7 days   Lab Units 04/29/22  1905   LACTIC ACID mmol/L 1 9       Imaging: Personally reviewed the following imaging: xray(s)  XR knee 3 views right non injury    (Results Pending)   CT lower extremity wo contrast right    (Results Pending)       EKG and Other Studies Reviewed on Admission:   · EKG: No EKG obtained  ** Please Note: This note has been constructed using a voice recognition system   **

## 2022-04-30 NOTE — PROGRESS NOTES
FYI approximately 4 feet of packing placed in the knee  It is to be replaced/repacked on Monday, then follow up for removal in the office Friday with Dr Eulogio Cota

## 2022-04-30 NOTE — PROGRESS NOTES
Vancomycin Assessment    Maria R Powellsin is a 40 y o  male who is currently receiving vancomycin 1250mg load (15mg/kg TBW) for other bone & Joint infection   Relevant clinical data and objective history reviewed:  Creatinine   Date Value Ref Range Status   04/29/2022 1 11 0 60 - 1 30 mg/dL Final     Comment:     Standardized to IDMS reference method     /91   Pulse 88   Temp 98 9 °F (37 2 °C) (Temporal)   Resp 17   Ht 6' (1 829 m)   Wt 84 kg (185 lb 3 oz)   SpO2 98%   BMI 25 12 kg/m²   No intake/output data recorded  Lab Results   Component Value Date/Time    BUN 11 04/29/2022 07:05 AM    WBC 16 68 (H) 04/29/2022 07:05 AM    HGB 14 4 04/29/2022 07:05 AM    HCT 43 3 04/29/2022 07:05 AM    MCV 97 04/29/2022 07:05 AM     04/29/2022 07:05 AM     Temp Readings from Last 3 Encounters:   04/29/22 98 9 °F (37 2 °C) (Temporal)   02/01/22 100 °F (37 8 °C) (Temporal)     Vancomycin Days of Therapy: 1    Assessment/Plan  The patient is currently on vancomycin utilizing scheduled dosing based on actual body weight  Baseline risks associated with therapy include: dehydration  The patient is currently receiving 1250mg load (15mg/kg TBW) and after clinical evaluation will be changed to 1750mg q12h (20mg/kg TBW), starting at 0800  Pharmacy will also follow closely for s/sx of nephrotoxicity, infusion reactions, and appropriateness of therapy  BMP and CBC will be ordered per protocol  Plan for trough as patient approaches steady state, prior to the 4th  dose at approximately 1930 on 5/1  Due to infection severity, will target a trough of 15-20 (appropriate for most indications)   Pharmacy will continue to follow the patients culture results and clinical progress daily      Feroz Hernández, Pharmacist

## 2022-04-30 NOTE — ASSESSMENT & PLAN NOTE
· WBC 16 on admission   · Suspected source of infection right knee cellulitis vs septic joint/effusion  · F/u blood cultures, continue broad spectrum antibiotics

## 2022-04-30 NOTE — ANESTHESIA POSTPROCEDURE EVALUATION
Post-Op Assessment Note    CV Status:  Stable    Pain management: adequate     Mental Status:  Alert and awake   Hydration Status:  Euvolemic   PONV Controlled:  Controlled   Airway Patency:  Patent      Post Op Vitals Reviewed: Yes      Staff: CRNA         No complications documented      /79 (04/30/22 0940)    Temp 97 9 °F (36 6 °C) (04/30/22 0940)    Pulse 71 (04/30/22 0940)   Resp 12 (04/30/22 0940)    SpO2 100 % (04/30/22 0940)

## 2022-05-01 LAB
MRSA NOSE QL CULT: ABNORMAL
VANCOMYCIN TROUGH SERPL-MCNC: 14 UG/ML (ref 10–20)

## 2022-05-01 PROCEDURE — 99024 POSTOP FOLLOW-UP VISIT: CPT | Performed by: PHYSICIAN ASSISTANT

## 2022-05-01 PROCEDURE — 80202 ASSAY OF VANCOMYCIN: CPT | Performed by: FAMILY MEDICINE

## 2022-05-01 PROCEDURE — 99232 SBSQ HOSP IP/OBS MODERATE 35: CPT | Performed by: FAMILY MEDICINE

## 2022-05-01 RX ORDER — LIDOCAINE 50 MG/G
1 PATCH TOPICAL DAILY
Status: DISCONTINUED | OUTPATIENT
Start: 2022-05-01 | End: 2022-05-04 | Stop reason: HOSPADM

## 2022-05-01 RX ORDER — POLYETHYLENE GLYCOL 3350 17 G/17G
17 POWDER, FOR SOLUTION ORAL DAILY
Status: DISCONTINUED | OUTPATIENT
Start: 2022-05-01 | End: 2022-05-04 | Stop reason: HOSPADM

## 2022-05-01 RX ORDER — AMOXICILLIN 250 MG
1 CAPSULE ORAL
Status: DISCONTINUED | OUTPATIENT
Start: 2022-05-01 | End: 2022-05-04 | Stop reason: HOSPADM

## 2022-05-01 RX ADMIN — VANCOMYCIN HYDROCHLORIDE 1250 MG: 1 INJECTION, POWDER, LYOPHILIZED, FOR SOLUTION INTRAVENOUS at 12:46

## 2022-05-01 RX ADMIN — CEFEPIME HYDROCHLORIDE 2000 MG: 2 INJECTION, SOLUTION INTRAVENOUS at 22:42

## 2022-05-01 RX ADMIN — VANCOMYCIN HYDROCHLORIDE 1250 MG: 1 INJECTION, POWDER, LYOPHILIZED, FOR SOLUTION INTRAVENOUS at 04:53

## 2022-05-01 RX ADMIN — OXYCODONE HYDROCHLORIDE AND ACETAMINOPHEN 2 TABLET: 5; 325 TABLET ORAL at 19:26

## 2022-05-01 RX ADMIN — SODIUM CHLORIDE 125 ML/HR: 0.9 INJECTION, SOLUTION INTRAVENOUS at 21:49

## 2022-05-01 RX ADMIN — DOCUSATE SODIUM AND SENNOSIDES 1 TABLET: 8.6; 5 TABLET, FILM COATED ORAL at 21:49

## 2022-05-01 RX ADMIN — ENOXAPARIN SODIUM 40 MG: 40 INJECTION SUBCUTANEOUS at 21:49

## 2022-05-01 RX ADMIN — VANCOMYCIN HYDROCHLORIDE 1500 MG: 1 INJECTION, POWDER, LYOPHILIZED, FOR SOLUTION INTRAVENOUS at 19:43

## 2022-05-01 RX ADMIN — POLYETHYLENE GLYCOL 3350 17 G: 17 POWDER, FOR SOLUTION ORAL at 11:24

## 2022-05-01 RX ADMIN — MORPHINE SULFATE 2 MG: 2 INJECTION, SOLUTION INTRAMUSCULAR; INTRAVENOUS at 00:09

## 2022-05-01 RX ADMIN — CEFEPIME HYDROCHLORIDE 2000 MG: 2 INJECTION, SOLUTION INTRAVENOUS at 11:24

## 2022-05-01 RX ADMIN — SODIUM CHLORIDE 125 ML/HR: 0.9 INJECTION, SOLUTION INTRAVENOUS at 00:11

## 2022-05-01 RX ADMIN — MORPHINE SULFATE 2 MG: 2 INJECTION, SOLUTION INTRAMUSCULAR; INTRAVENOUS at 06:12

## 2022-05-01 NOTE — PROGRESS NOTES
Orthopedics   Derike Roe 40 y o  male MRN: [id number]  Unit/Bed#: -01      Subjective:   40 y o male post operative day 1 right knee prepatellar bursa incision and drainage with packing placed  Pt doing slightly better  Pain controlled  Still having about the same pain is yesterday  Patient was able ambulate to the bathroom  Culture still pending      Labs:  0   Lab Value Date/Time    HCT 42 1 04/30/2022 0525    HCT 43 3 04/29/2022 0705    HGB 13 8 04/30/2022 0525    HGB 14 4 04/29/2022 0705    INR 0 94 04/30/2022 0525    WBC 15 91 (H) 04/30/2022 0525    WBC 16 68 (H) 04/29/2022 0705    ESR 28 (H) 04/29/2022 0705     4 (H) 04/29/2022 0705     Meds:    Current Facility-Administered Medications:     acetaminophen (TYLENOL) tablet 650 mg, 650 mg, Oral, Q6H PRN, Karthikeyan Hernandez MD    cefepime (MAXIPIME) IVPB (premix in dextrose) 2,000 mg 50 mL, 2,000 mg, Intravenous, Q12H, Karthikeyan Hernandez MD, Last Rate: 100 mL/hr at 04/30/22 2310, 2,000 mg at 04/30/22 2310    enoxaparin (LOVENOX) subcutaneous injection 40 mg, 40 mg, Subcutaneous, Q24H [address], Karthikeyan Hernandez MD, 40 mg at 04/30/22 2053    HYDROmorphone HCl (DILAUDID) injection 0 2 mg, 0 2 mg, Intravenous, Q4H PRN, Abhay Arango MD    morphine injection 2 mg, 2 mg, Intravenous, Q4H PRN, Karthikeyan Hernandez MD, 2 mg at 05/01/22 0612    naloxone (NARCAN) 0 04 mg/mL syringe 0 04 mg, 0 04 mg, Intravenous, Q1MIN PRN, Abhay Arango MD    nicotine (NICODERM CQ) 14 mg/24hr TD 24 hr patch 1 patch, 1 patch, Transdermal, Daily, Karthikeyan Hernandez MD    ondansetron [facility]) injection 4 mg, 4 mg, Intravenous, Q6H PRN, Karthikeyan Hernandez MD    oxyCODONE-acetaminophen (PERCOCET) 5-325 mg per tablet 2 tablet, 2 tablet, Oral, Q4H PRN, Karthikeyan Hernandez MD, 2 tablet at 04/30/22 1035    sodium chloride 0 9 % infusion, 125 mL/hr, Intravenous, Continuous, Mackenzie Helms CRNA, Last Rate: 125 mL/hr at 05/01/22 0011, 125 mL/hr at 05/01/22 0011    vancomycin (VANCOCIN) 1,250 mg in sodium chloride 0 9 % 250 mL IVPB, 15 mg/kg, Intravenous, Q8H, Vasile Sue MD, Last Rate: 166 7 mL/hr at 05/01/22 0453, 1,250 mg at 05/01/22 0453    Blood Culture:   Lab Results   Component Value Date    BLOODCX No Growth at 24 hrs  04/29/2022    BLOODCX No Growth at 24 hrs  04/29/2022     Wound Culture:   No results found for: WOUNDCULT    Ins and Outs:  I/O last 24 hours: In: 2110 [P O :410; I V :1150; IV Piggyback:550]  Out: 900 [Urine:900]    Physical Exam:  Vitals:    05/01/22 0638   BP: 123/78   Pulse: 85   Resp: 18   Temp: 98 °F (36 7 °C)   SpO2: 95%     right lower extremity  · Knee immobilizer and dressings in place the right knee  There is no which there is slightly decreased erythema about the knee  Proximally 2 ft of packing was removed and wound redressed  · Sensation intact L2-S1  · Motor intact to knee flexion/extension, EHL/FHL  · 2+ dorsalis pedis pulse  · No calf pain negative Homans sign  Assessment: 40 y o male post operative day 1 right knee prepatellar bursa incision and drainage  Patient doing slightly better  Plan:  · Up and out of bed  · Weight-bearing as tolerated in knee immobilizer to the right lower extremity  · Will removed all packing in place new packing tomorrow  Pain meds prior to that  Patient to see Dr Yenni Mishra and if next week in the office for packing removal   · DVT prophylaxis per Primary Service  · Analgesics as needed  · Awaiting final cultures  Patient may likely need PICC line with IV antibiotics    · Will continue to assess for acute blood loss anemia      Mo Blood PA-C

## 2022-05-01 NOTE — PROGRESS NOTES
Vancomycin Assessment    Kathryn Martinez is a 40 y o  male who is currently receiving vancomycin 1250 mg IV q 8 hrs for other bone-joint infection   Relevant clinical data and objective history reviewed:  Creatinine   Date Value Ref Range Status   04/30/2022 0 96 0 60 - 1 30 mg/dL Final     Comment:     Standardized to IDMS reference method   04/29/2022 1 11 0 60 - 1 30 mg/dL Final     Comment:     Standardized to IDMS reference method     /78   Pulse 85   Temp 98 °F (36 7 °C)   Resp 18   Ht 6' (1 829 m)   Wt 84 kg (185 lb 3 oz)   SpO2 98%   BMI 25 12 kg/m²   I/O last 3 completed shifts: In: 2160 [P O :410; I V :1150; IV Piggyback:600]  Out: 6303 [Urine:1225]  Lab Results   Component Value Date/Time    BUN 14 04/30/2022 05:25 AM    WBC 15 91 (H) 04/30/2022 05:25 AM    HGB 13 8 04/30/2022 05:25 AM    HCT 42 1 04/30/2022 05:25 AM    MCV 97 04/30/2022 05:25 AM     04/30/2022 05:25 AM     Temp Readings from Last 3 Encounters:   05/01/22 98 °F (36 7 °C)   02/01/22 100 °F (37 8 °C) (Temporal)     Vancomycin Days of Therapy: 3    Assessment/Plan  The patient is currently on vancomycin utilizing scheduled dosing  Baseline risks associated with therapy include: concomitant nephrotoxic medications  The patient is receiving 1250 mg IV q 8 hrs with the most recent vancomycin level being at steady-state and supratherapeutic based on a goal of 15-20 (appropriate for most indications) ; therefore, after clinical evaluation will be changed to 1500 mg IV q 8 hrs   Pharmacy will continue to follow closely for s/sx of nephrotoxicity, infusion reactions, and appropriateness of therapy  BMP and CBC will be ordered per protocol  Plan for trough as patient approaches steady state, prior to the 4th  dose at approximately 1900 on 05/02/2022  Pharmacy will continue to follow the patients culture results and clinical progress daily      Jose L Gunter, Pharmacist 2

## 2022-05-01 NOTE — ASSESSMENT & PLAN NOTE
· WBC 16 on admission   · Suspected source of infection right knee cellulitis vs septic joint/effusion  ·  continue broad spectrum antibiotics   · Blood culture shows no growth in 24 hours, but the fluid from prepatellar side growing bacteria continue IV antibiotic    Pending ID recommendation

## 2022-05-01 NOTE — PROGRESS NOTES
[address]  Progress Note - Prosper Wyatt 1975, 40 y o  male MRN: [id number]  Unit/Bed#: -Justyna Encounter: [id number]  Primary Care Provider: No primary care provider on file  Date and time admitted to hospital: 4/29/2022  6:47 PM    * Prepatellar bursitis of right knee  Assessment & Plan  Septic bursitis  Status post arthroscopy with orthopedics  As per operative finding notes:Purulence from the prepatellar bursa incision drainage necrotic bursa under the fascia excised completely tendon intact bone intact wound left open after washout cultures obtained deep  approximately 4 feet of packing placed in the knee  It is to be replaced/repacked on Monday, then follow up for removal in the office Friday with Dr Jyoti Boyd  Culture is growing beta-hemolytic Streptococcus  Pending ID consult    Hyponatremia  Assessment & Plan  · Na 134   · Suspect due to hypovolemia  · Trend BMP    Leukocytosis  Assessment & Plan  · WBC 16 on admission   · Suspected source of infection right knee cellulitis vs septic joint/effusion  ·  continue broad spectrum antibiotics   · Blood culture shows no growth in 24 hours, but the fluid from prepatellar side growing bacteria continue IV antibiotic  Pending ID recommendation    Effusion of right knee  Assessment & Plan  · Presents with right knee pain, swelling, and erythema that started 2 days ago  Denies any trauma or injury to the area  · Per ED provider, right knee tapped for 3cc fluid in ED  Synovial fluid analysis showed elevated WBC's 331  · , sed rate 24   · Afebrile, lactate normal   · ED provider discussed with orthopedics on call, recommending CT of right knee and IV antiobiotics  · Appreciate orthopedics consult  · , continue vancomycin and cefepime  ·  Check uric acid, procalcitonin, MRSA swab  Lyme titer pending     · Blood culture shows no growth in 24 hours,   · Pain control   · Status post arthroscopy on 04/30/2022  · As per operating finding notes:Right knee aspirated prior to the operation aseptically 5 cc of blood-tinged fluid no evidence of purulence, sent for separate cultures and Gram stain                VTE Pharmacologic Prophylaxis: VTE Score: 2 Moderate Risk (Score 3-4) - Pharmacological DVT Prophylaxis Ordered: enoxaparin (Lovenox)  Patient Centered Rounds: I performed bedside rounds with nursing staff today  Discussions with Specialists or Other Care Team Provider:  Orthopedic note reviewed    Education and Discussions with Family / Patient: Updated  (wife) at bedside  Time Spent for Care: 15 minutes  More than 50% of total time spent on counseling and coordination of care as described above  Current Length of Stay: 1 day(s)  Current Patient Status: Inpatient   Certification Statement: The patient will continue to require additional inpatient hospital stay due to To monitor above condition  Discharge Plan: Anticipate discharge in 48-72 hrs to To be determined    Code Status: Level 1 - Full Code    Subjective:   Seen and evaluated during the round  Patient lying down, complaining of pain on the right needs patient when they tried to move around  Have not have any bowel movement yet  Objective:     Vitals:   Temp (24hrs), Av 8 °F (37 1 °C), Min:98 °F (36 7 °C), Max:100 °F (37 8 °C)    Temp:  [98 °F (36 7 °C)-100 °F (37 8 °C)] 100 °F (37 8 °C)  HR:  [85-95] 95  Resp:  [16-18] 16  BP: (117-125)/(74-78) 125/78  SpO2:  [93 %-98 %] 98 %  Body mass index is 25 12 kg/m²  Input and Output Summary (last 24 hours): Intake/Output Summary (Last 24 hours) at 2022 1618  Last data filed at 2022 0942  Gross per 24 hour   Intake 1240 ml   Output 1125 ml   Net 115 ml       Physical Exam:   Physical Exam  Vitals and nursing note reviewed  Constitutional:       General: He is not in acute distress  Appearance: Normal appearance  He is not ill-appearing, toxic-appearing or diaphoretic     HENT: Head: Normocephalic  Nose: Nose normal  No congestion or rhinorrhea  Mouth/Throat:      Mouth: Mucous membranes are moist       Pharynx: Oropharynx is clear  No oropharyngeal exudate or posterior oropharyngeal erythema  Eyes:      General: No scleral icterus  Conjunctiva/sclera: Conjunctivae normal       Pupils: Pupils are equal, round, and reactive to light  Cardiovascular:      Rate and Rhythm: Normal rate and regular rhythm  Heart sounds: No murmur heard  No friction rub  No gallop  Pulmonary:      Effort: Pulmonary effort is normal  No respiratory distress  Breath sounds: No stridor  No wheezing or rhonchi  Abdominal:      General: Abdomen is flat  Bowel sounds are normal  There is no distension  Palpations: There is no mass  Tenderness: There is no abdominal tenderness  Hernia: No hernia is present  Musculoskeletal:         General: Tenderness (Right knee) present  No deformity  Right lower leg: No edema  Left lower leg: No edema  Comments: Decreased range of motion right knee   Skin:     General: Skin is warm  Capillary Refill: Capillary refill takes less than 2 seconds  Coloration: Skin is not jaundiced or pale  Findings: No bruising or erythema  Neurological:      General: No focal deficit present  Mental Status: He is alert and oriented to person, place, and time  Cranial Nerves: No cranial nerve deficit  Sensory: No sensory deficit  Motor: No weakness        Coordination: Coordination normal    Psychiatric:         Mood and Affect: Mood normal          Additional Data:     Labs:  Results from last 7 days   Lab Units 04/30/22  0525   WBC Thousand/uL 15 91*   HEMOGLOBIN g/dL 13 8   HEMATOCRIT % 42 1   PLATELETS Thousands/uL 325   NEUTROS PCT % 76*   LYMPHS PCT % 12*   MONOS PCT % 11   EOS PCT % 1     Results from last 7 days   Lab Units 04/30/22  0525   SODIUM mmol/L 134*   POTASSIUM mmol/L 3 8 CHLORIDE mmol/L 101   CO2 mmol/L 28   BUN mg/dL 14   CREATININE mg/dL 0 96   ANION GAP mmol/L 5   CALCIUM mg/dL 9 3   ALBUMIN g/dL 3 0*   TOTAL BILIRUBIN mg/dL 0 90   ALK PHOS U/L 89   ALT U/L 35   AST U/L 15   GLUCOSE RANDOM mg/dL 97     Results from last 7 days   Lab Units 04/30/22  0525   INR  0 94             Results from last 7 days   Lab Units 04/29/22  1905 04/29/22  0705   LACTIC ACID mmol/L 1 9  --    PROCALCITONIN ng/ml  --  0 16       Lines/Drains:  Invasive Devices  Report    Peripheral Intravenous Line            Peripheral IV 05/01/22 Left Forearm <1 day                      Imaging: No pertinent imaging reviewed  Recent Cultures (last 7 days):   Results from last 7 days   Lab Units 04/30/22  0908 04/30/22  0906 04/30/22  0905 04/29/22 2027 04/29/22  1908   BLOOD CULTURE   --   --   --   --  No Growth at 24 hrs  No Growth at 24 hrs     GRAM STAIN RESULT  1+ Polys  No bacteria seen Rare Polys  No bacteria seen 1+ Polys*  1+ Gram positive cocci in pairs and chains* Rare Polys  No bacteria seen  --    BODY FLUID CULTURE, STERILE  No growth  --  2+ Growth of Beta Hemolytic Streptococcus Group G* No growth  --        Last 24 Hours Medication List:   Current Facility-Administered Medications   Medication Dose Route Frequency Provider Last Rate    acetaminophen  650 mg Oral Q6H PRN Yue Ruiz MD      cefepime  2,000 mg Intravenous Q12H Yue Ruiz MD 2,000 mg (05/01/22 1124)    enoxaparin  40 mg Subcutaneous Q24H [address] Yue Ruiz MD      HYDROmorphone  0 2 mg Intravenous Q4H PRN Ar Price MD      lidocaine  1 patch Topical Daily Ar Price MD      morphine injection  2 mg Intravenous Q4H PRN Yue Ruiz MD      naloxone  0 04 mg Intravenous Q1MIN PRN Ar Price MD      nicotine  1 patch Transdermal Daily Yue Ruiz MD      ondansetron  4 mg Intravenous Q6H PRN Yue Ruiz MD      oxyCODONE-acetaminophen  2 tablet Oral Q4H PRN Yue Ruiz MD  polyethylene glycol  17 g Oral Daily Donna Kasper MD      senna-docusate sodium  1 tablet Oral HS Donna Kasper MD      sodium chloride  125 mL/hr Intravenous Continuous Mi Biju, CRNA 125 mL/hr (05/01/22 0011)    vancomycin  1,500 mg Intravenous Jose Pham MD          Today, Patient Was Seen By: Donna Kasper MD    **Please Note: This note may have been constructed using a voice recognition system  **

## 2022-05-01 NOTE — PLAN OF CARE
Problem: SAFETY ADULT  Goal: Patient will remain free of falls  Description: INTERVENTIONS:  - Educate patient/family on patient safety including physical limitations  - Instruct patient to call for assistance with activity   - Consult OT/PT to assist with strengthening/mobility   - Keep Call bell within reach  - Keep bed low and locked with side rails adjusted as appropriate  - Keep care items and personal belongings within reach  - Initiate and maintain comfort rounds  - Make Fall Risk Sign visible to staff  - Offer Toileting every 2 Hours, in advance of need  - Initiate/Maintain bed/chair alarm  - Apply yellow socks and bracelet for high fall risk patients  - Consider moving patient to room near nurses station  Outcome: Progressing  Goal: Maintain or return to baseline ADL function  Description: INTERVENTIONS:  -  Assess patient's ability to carry out ADLs; assess patient's baseline for ADL function and identify physical deficits which impact ability to perform ADLs (bathing, care of mouth/teeth, toileting, grooming, dressing, etc )  - Assess/evaluate cause of self-care deficits   - Assess range of motion  - Assess patient's mobility; develop plan if impaired  - Assess patient's need for assistive devices and provide as appropriate  - Encourage maximum independence but intervene and supervise when necessary  - Involve family in performance of ADLs  - Assess for home care needs following discharge   - Consider OT consult to assist with ADL evaluation and planning for discharge  - Provide patient education as appropriate  Outcome: Progressing  Goal: Maintains/Returns to pre admission functional level  Description: INTERVENTIONS:  - Perform BMAT or MOVE assessment daily    - Set and communicate daily mobility goal to care team and patient/family/caregiver     - Collaborate with rehabilitation services on mobility goals if consulted  - Stand patient 2 times a day  - Out of bed to chair 3 times a day   - Out of bed for meals 3 times a day  - Out of bed for toileting  - Record patient progress and toleration of activity level   Outcome: Progressing

## 2022-05-01 NOTE — ASSESSMENT & PLAN NOTE
· Presents with right knee pain, swelling, and erythema that started 2 days ago  Denies any trauma or injury to the area  · Per ED provider, right knee tapped for 3cc fluid in ED  Synovial fluid analysis showed elevated WBC's 331  · , sed rate 24   · Afebrile, lactate normal   · ED provider discussed with orthopedics on call, recommending CT of right knee and IV antiobiotics  · Appreciate orthopedics consult  · , continue vancomycin and cefepime  ·  Check uric acid, procalcitonin, MRSA swab  Lyme titer pending     · Blood culture shows no growth in 24 hours,   · Pain control   · Status post arthroscopy on 04/30/2022  · As per operating finding notes:Right knee aspirated prior to the operation aseptically 5 cc of blood-tinged fluid no evidence of purulence, sent for separate cultures and Gram stain

## 2022-05-01 NOTE — ASSESSMENT & PLAN NOTE
Septic bursitis  Status post arthroscopy with orthopedics  As per operative finding notes:Purulence from the prepatellar bursa incision drainage necrotic bursa under the fascia excised completely tendon intact bone intact wound left open after washout cultures obtained deep  approximately 4 feet of packing placed in the knee  It is to be replaced/repacked on Monday, then follow up for removal in the office Friday with Dr Jennifer Almodovar        Culture is growing beta-hemolytic Streptococcus  Pending ID consult

## 2022-05-02 LAB
ALBUMIN SERPL BCP-MCNC: 2.2 G/DL (ref 3.5–5)
ALP SERPL-CCNC: 100 U/L (ref 46–116)
ALT SERPL W P-5'-P-CCNC: 44 U/L (ref 12–78)
ANION GAP SERPL CALCULATED.3IONS-SCNC: 7 MMOL/L (ref 4–13)
AST SERPL W P-5'-P-CCNC: 32 U/L (ref 5–45)
BACTERIA SPEC BFLD CULT: ABNORMAL
BASOPHILS # BLD AUTO: 0.07 THOUSANDS/ΜL (ref 0–0.1)
BASOPHILS NFR BLD AUTO: 1 % (ref 0–1)
BILIRUB SERPL-MCNC: 0.26 MG/DL (ref 0.2–1)
BUN SERPL-MCNC: 17 MG/DL (ref 5–25)
CALCIUM ALBUM COR SERPL-MCNC: 9.1 MG/DL (ref 8.3–10.1)
CALCIUM SERPL-MCNC: 7.7 MG/DL (ref 8.3–10.1)
CHLORIDE SERPL-SCNC: 103 MMOL/L (ref 100–108)
CO2 SERPL-SCNC: 27 MMOL/L (ref 21–32)
CREAT SERPL-MCNC: 0.79 MG/DL (ref 0.6–1.3)
EOSINOPHIL # BLD AUTO: 0.54 THOUSAND/ΜL (ref 0–0.61)
EOSINOPHIL NFR BLD AUTO: 5 % (ref 0–6)
ERYTHROCYTE [DISTWIDTH] IN BLOOD BY AUTOMATED COUNT: 13 % (ref 11.6–15.1)
GFR SERPL CREATININE-BSD FRML MDRD: 108 ML/MIN/1.73SQ M
GLUCOSE SERPL-MCNC: 104 MG/DL (ref 65–140)
GRAM STN SPEC: ABNORMAL
GRAM STN SPEC: ABNORMAL
HCT VFR BLD AUTO: 37.7 % (ref 36.5–49.3)
HGB BLD-MCNC: 12.2 G/DL (ref 12–17)
IMM GRANULOCYTES # BLD AUTO: 0.06 THOUSAND/UL (ref 0–0.2)
IMM GRANULOCYTES NFR BLD AUTO: 1 % (ref 0–2)
LYMPHOCYTES # BLD AUTO: 2.21 THOUSANDS/ΜL (ref 0.6–4.47)
LYMPHOCYTES NFR BLD AUTO: 22 % (ref 14–44)
MCH RBC QN AUTO: 31.7 PG (ref 26.8–34.3)
MCHC RBC AUTO-ENTMCNC: 32.4 G/DL (ref 31.4–37.4)
MCV RBC AUTO: 98 FL (ref 82–98)
MONOCYTES # BLD AUTO: 1.38 THOUSAND/ΜL (ref 0.17–1.22)
MONOCYTES NFR BLD AUTO: 14 % (ref 4–12)
NEUTROPHILS # BLD AUTO: 5.65 THOUSANDS/ΜL (ref 1.85–7.62)
NEUTS SEG NFR BLD AUTO: 57 % (ref 43–75)
NRBC BLD AUTO-RTO: 0 /100 WBCS
PLATELET # BLD AUTO: 340 THOUSANDS/UL (ref 149–390)
PMV BLD AUTO: 9.1 FL (ref 8.9–12.7)
POTASSIUM SERPL-SCNC: 3.9 MMOL/L (ref 3.5–5.3)
PROT SERPL-MCNC: 5.8 G/DL (ref 6.4–8.2)
RBC # BLD AUTO: 3.85 MILLION/UL (ref 3.88–5.62)
SODIUM SERPL-SCNC: 137 MMOL/L (ref 136–145)
VANCOMYCIN TROUGH SERPL-MCNC: 16.3 UG/ML (ref 10–20)
WBC # BLD AUTO: 9.91 THOUSAND/UL (ref 4.31–10.16)

## 2022-05-02 PROCEDURE — 85025 COMPLETE CBC W/AUTO DIFF WBC: CPT | Performed by: FAMILY MEDICINE

## 2022-05-02 PROCEDURE — 80053 COMPREHEN METABOLIC PANEL: CPT | Performed by: FAMILY MEDICINE

## 2022-05-02 PROCEDURE — 80202 ASSAY OF VANCOMYCIN: CPT | Performed by: ORTHOPAEDIC SURGERY

## 2022-05-02 PROCEDURE — 99024 POSTOP FOLLOW-UP VISIT: CPT | Performed by: ORTHOPAEDIC SURGERY

## 2022-05-02 PROCEDURE — 99232 SBSQ HOSP IP/OBS MODERATE 35: CPT | Performed by: INTERNAL MEDICINE

## 2022-05-02 RX ADMIN — ENOXAPARIN SODIUM 40 MG: 40 INJECTION SUBCUTANEOUS at 20:33

## 2022-05-02 RX ADMIN — CEFEPIME HYDROCHLORIDE 2000 MG: 2 INJECTION, SOLUTION INTRAVENOUS at 11:45

## 2022-05-02 RX ADMIN — OXYCODONE HYDROCHLORIDE AND ACETAMINOPHEN 2 TABLET: 5; 325 TABLET ORAL at 09:01

## 2022-05-02 RX ADMIN — CEFEPIME HYDROCHLORIDE 2000 MG: 2 INJECTION, SOLUTION INTRAVENOUS at 23:27

## 2022-05-02 RX ADMIN — DOCUSATE SODIUM AND SENNOSIDES 1 TABLET: 8.6; 5 TABLET, FILM COATED ORAL at 20:33

## 2022-05-02 RX ADMIN — VANCOMYCIN HYDROCHLORIDE 1500 MG: 1 INJECTION, POWDER, LYOPHILIZED, FOR SOLUTION INTRAVENOUS at 12:28

## 2022-05-02 RX ADMIN — VANCOMYCIN HYDROCHLORIDE 1500 MG: 1 INJECTION, POWDER, LYOPHILIZED, FOR SOLUTION INTRAVENOUS at 03:12

## 2022-05-02 RX ADMIN — LIDOCAINE 5% 1 PATCH: 700 PATCH TOPICAL at 08:52

## 2022-05-02 RX ADMIN — VANCOMYCIN HYDROCHLORIDE 1500 MG: 1 INJECTION, POWDER, LYOPHILIZED, FOR SOLUTION INTRAVENOUS at 19:18

## 2022-05-02 NOTE — PROGRESS NOTES
[address]  Progress Note - Saira Henderson 1975, 40 y o  male MRN: [id number]  Unit/Bed#: -01 Encounter: [id number]  Primary Care Provider: No primary care provider on file  Date and time admitted to hospital: 4/29/2022  6:47 PM    Hyponatremia  Assessment & Plan  · Has since resolved    Leukocytosis  Assessment & Plan  · WBC 16 on admission   · Suspected source of infection right knee cellulitis vs septic joint/effusion  ·  continue broad spectrum antibiotics   · Blood culture shows no growth in 24 hours, but the fluid from prepatellar side growing bacteria continue IV antibiotic  Pending ID recommendation    Effusion of right knee  Assessment & Plan  · Resents with right knee pain, swelling, and erythema that started 2 days ago  Denies any trauma or injury to the area  · Per ED provider, right knee tapped for 3cc fluid in ED  Synovial fluid analysis showed elevated WBC's 331  · , sed rate 24   · Afebrile, lactate normal   · ED provider discussed with orthopedics on call, recommending CT of right knee and IV antiobiotics  · Appreciate orthopedics consult  · , continue vancomycin and cefepime  ·  Check uric acid, procalcitonin, MRSA swab  Lyme titer pending  · Blood culture shows no growth in 24 hours,   · Pain control   · Status post arthroscopy on 04/30/2022  · As per operating finding notes:Right knee aspirated prior to the operation aseptically 5 cc of blood-tinged fluid no evidence of purulence, sent for separate cultures and Gram stain  · Follow-up ID consultation          * Prepatellar bursitis of right knee  Assessment & Plan  Septic bursitis  Status post arthroscopy with orthopedics  As per operative finding notes:Purulence from the prepatellar bursa incision drainage necrotic bursa under the fascia excised completely tendon intact bone intact wound left open after washout cultures obtained deep  approximately 4 feet of packing placed in the knee    It is to be replaced/repacked on Monday, then follow up for removal in the office Friday with Dr Gita Michel  Culture is growing beta-hemolytic Streptococcus  Pending ID consult      VTE Pharmacologic Prophylaxis:   Pharmacologic: Enoxaparin (Lovenox)  Mechanical VTE Prophylaxis in Place: Yes    Patient Centered Rounds: I have performed bedside rounds with nursing staff today  Discussions with Specialists or Other Care Team Provider: cm, nursing    Education and Discussions with Family / Patient: pt    Time Spent for Care: 30 minutes  More than 50% of total time spent on counseling and coordination of care as described above  Current Length of Stay: 2 day(s)    Current Patient Status: Inpatient   Certification Statement: The patient will continue to require additional inpatient hospital stay due to see above    Discharge Plan:  Still requiring IV antibiotics  Hopeful discharge next 24-48 hours    Code Status: Level 1 - Full Code      Subjective:   Denies chest pain, shortness breath, cough, fevers  Objective:     Vitals:   Temp (24hrs), Av 9 °F (37 2 °C), Min:97 9 °F (36 6 °C), Max:100 °F (37 8 °C)    Temp:  [97 9 °F (36 6 °C)-100 °F (37 8 °C)] 97 9 °F (36 6 °C)  HR:  [88-95] 89  Resp:  [16-21] 21  BP: (111-125)/(67-79) 114/79  SpO2:  [94 %-98 %] 95 %  Body mass index is 25 12 kg/m²  Input and Output Summary (last 24 hours): Intake/Output Summary (Last 24 hours) at 2022 9866  Last data filed at 2022 4882  Gross per 24 hour   Intake 1540 ml   Output 2075 ml   Net -535 ml       Physical Exam:     Physical Exam  Constitutional:       General: He is not in acute distress  Appearance: He is well-developed  He is not diaphoretic  HENT:      Head: Normocephalic and atraumatic  Nose: Nose normal       Mouth/Throat:      Pharynx: No oropharyngeal exudate  Eyes:      General: No scleral icterus       Conjunctiva/sclera: Conjunctivae normal    Cardiovascular:      Rate and Rhythm: Normal rate and regular rhythm  Heart sounds: Normal heart sounds  No murmur heard  No friction rub  No gallop  Pulmonary:      Effort: Pulmonary effort is normal  No respiratory distress  Breath sounds: Normal breath sounds  No wheezing or rales  Chest:      Chest wall: No tenderness  Abdominal:      General: Bowel sounds are normal  There is no distension  Palpations: Abdomen is soft  Tenderness: There is no abdominal tenderness  There is no guarding  Musculoskeletal:         General: No tenderness or deformity  Normal range of motion  Cervical back: Normal range of motion and neck supple  Skin:     General: Skin is warm and dry  Findings: No erythema  Neurological:      Mental Status: He is alert  Mental status is at baseline  Additional Data:     Labs:    Results from last 7 days   Lab Units 05/02/22  0448   WBC Thousand/uL 9 91   HEMOGLOBIN g/dL 12 2   HEMATOCRIT % 37 7   PLATELETS Thousands/uL 340   NEUTROS PCT % 57   LYMPHS PCT % 22   MONOS PCT % 14*   EOS PCT % 5     Results from last 7 days   Lab Units 05/02/22  0448   SODIUM mmol/L 137   POTASSIUM mmol/L 3 9   CHLORIDE mmol/L 103   CO2 mmol/L 27   BUN mg/dL 17   CREATININE mg/dL 0 79   ANION GAP mmol/L 7   CALCIUM mg/dL 7 7*   ALBUMIN g/dL 2 2*   TOTAL BILIRUBIN mg/dL 0 26   ALK PHOS U/L 100   ALT U/L 44   AST U/L 32   GLUCOSE RANDOM mg/dL 104     Results from last 7 days   Lab Units 04/30/22  0525   INR  0 94             Results from last 7 days   Lab Units 04/29/22  1905 04/29/22  0705   LACTIC ACID mmol/L 1 9  --    PROCALCITONIN ng/ml  --  0 16           * I Have Reviewed All Lab Data Listed Above  * Additional Pertinent Lab Tests Reviewed:  All Labs Within Last 24 Hours Reviewed    Imaging:    Imaging Reports Reviewed Today Include: na  Imaging Personally Reviewed by Myself Includes:  na    Recent Cultures (last 7 days):     Results from last 7 days   Lab Units 04/30/22  0908 04/30/22  0906 04/30/22  8920 04/29/22 2027 04/29/22 1908   BLOOD CULTURE   --   --   --   --  No Growth at 48 hrs  No Growth at 48 hrs  GRAM STAIN RESULT  1+ Polys  No bacteria seen Rare Polys  No bacteria seen 1+ Polys*  1+ Gram positive cocci in pairs and chains* Rare Polys  No bacteria seen  --    BODY FLUID CULTURE, STERILE  No growth  --  2+ Growth of Beta Hemolytic Streptococcus Group G* No growth  --        Last 24 Hours Medication List:   Current Facility-Administered Medications   Medication Dose Route Frequency Provider Last Rate    acetaminophen  650 mg Oral Q6H PRN Lazara Treviño MD      cefepime  2,000 mg Intravenous Q12H Lazara Treviño MD 2,000 mg (05/01/22 2242)    enoxaparin  40 mg Subcutaneous Q24H [facility] Lazara Treviño MD      HYDROmorphone  0 2 mg Intravenous Q4H PRN Stephen Wisdom MD      lidocaine  1 patch Topical Daily Stephen Wisdom MD      morphine injection  2 mg Intravenous Q4H PRN Lazara Treviño MD      naloxone  0 04 mg Intravenous Q1MIN PRN Stephen Wisdom MD      nicotine  1 patch Transdermal Daily Lazara Treviño MD      ondansetron  4 mg Intravenous Q6H PRN Lazara Treviño MD      oxyCODONE-acetaminophen  2 tablet Oral Q4H PRN Lazara Treviño MD      polyethylene glycol  17 g Oral Daily Stephen Wisdom MD      senna-docusate sodium  1 tablet Oral HS Stephen Wisdom MD      sodium chloride  125 mL/hr Intravenous Continuous Loran Lav, CRNA 125 mL/hr (05/01/22 2149)    vancomycin  1,500 mg Intravenous Q8H Lazara Treviño MD          Today, Patient Was Seen By: Evin Townsend MD    ** Please Note: Dictation voice to text software may have been used in the creation of this document   **

## 2022-05-02 NOTE — ASSESSMENT & PLAN NOTE
· Resents with right knee pain, swelling, and erythema that started 2 days ago  Denies any trauma or injury to the area  · Per ED provider, right knee tapped for 3cc fluid in ED  Synovial fluid analysis showed elevated WBC's 331  · , sed rate 24   · Afebrile, lactate normal   · ED provider discussed with orthopedics on call, recommending CT of right knee and IV antiobiotics  · Appreciate orthopedics consult  · , continue vancomycin and cefepime  ·  Check uric acid, procalcitonin, MRSA swab  Lyme titer pending     · Blood culture shows no growth in 24 hours,   · Pain control   · Status post arthroscopy on 04/30/2022  · As per operating finding notes:Right knee aspirated prior to the operation aseptically 5 cc of blood-tinged fluid no evidence of purulence, sent for separate cultures and Gram stain  · Follow-up ID consultation

## 2022-05-02 NOTE — ASSESSMENT & PLAN NOTE
Septic bursitis  Status post arthroscopy with orthopedics  As per operative finding notes:Purulence from the prepatellar bursa incision drainage necrotic bursa under the fascia excised completely tendon intact bone intact wound left open after washout cultures obtained deep  approximately 4 feet of packing placed in the knee  It is to be replaced/repacked on Monday, then follow up for removal in the office Friday with Dr Mary Morley        Culture is growing beta-hemolytic Streptococcus  Pending ID consult

## 2022-05-02 NOTE — PLAN OF CARE
Problem: PAIN - ADULT  Goal: Verbalizes/displays adequate comfort level or baseline comfort level  Description: Interventions:  - Encourage patient to monitor pain and request assistance  - Assess pain using appropriate pain scale  - Administer analgesics based on type and severity of pain and evaluate response  - Implement non-pharmacological measures as appropriate and evaluate response  - Consider cultural and social influences on pain and pain management  - Notify physician/advanced practitioner if interventions unsuccessful or patient reports new pain  Outcome: Progressing     Problem: INFECTION - ADULT  Goal: Absence or prevention of progression during hospitalization  Description: INTERVENTIONS:  - Assess and monitor for signs and symptoms of infection  - Monitor lab/diagnostic results  - Monitor all insertion sites, i e  indwelling lines, tubes, and drains  - Monitor endotracheal if appropriate and nasal secretions for changes in amount and color  - [place] appropriate cooling/warming therapies per order  - Administer medications as ordered  - Instruct and encourage patient and family to use good hand hygiene technique  - Identify and instruct in appropriate isolation precautions for identified infection/condition  Outcome: Progressing     Problem: SAFETY ADULT  Goal: Patient will remain free of falls  Description: INTERVENTIONS:  - Educate patient/family on patient safety including physical limitations  - Instruct patient to call for assistance with activity   - Consult OT/PT to assist with strengthening/mobility   - Keep Call bell within reach  - Keep bed low and locked with side rails adjusted as appropriate  - Keep care items and personal belongings within reach  - Initiate and maintain comfort rounds  - Make Fall Risk Sign visible to staff  - Offer Toileting every 2 Hours, in advance of need  - Initiate/Maintain bed/chair alarm  - Apply yellow socks and bracelet for high fall risk patients  - Consider moving patient to room near nurses station  Outcome: Progressing  Goal: Maintain or return to baseline ADL function  Description: INTERVENTIONS:  -  Assess patient's ability to carry out ADLs; assess patient's baseline for ADL function and identify physical deficits which impact ability to perform ADLs (bathing, care of mouth/teeth, toileting, grooming, dressing, etc )  - Assess/evaluate cause of self-care deficits   - Assess range of motion  - Assess patient's mobility; develop plan if impaired  - Assess patient's need for assistive devices and provide as appropriate  - Encourage maximum independence but intervene and supervise when necessary  - Involve family in performance of ADLs  - Assess for home care needs following discharge   - Consider OT consult to assist with ADL evaluation and planning for discharge  - Provide patient education as appropriate  Outcome: Progressing  Goal: Maintains/Returns to pre admission functional level  Description: INTERVENTIONS:  - Perform BMAT or MOVE assessment daily    - Set and communicate daily mobility goal to care team and patient/family/caregiver     - Collaborate with rehabilitation services on mobility goals if consulted  - Stand patient 2 times a day  - Out of bed to chair 3 times a day   - Out of bed for meals 3 times a day  - Out of bed for toileting  - Record patient progress and toleration of activity level   Outcome: Progressing     Problem: DISCHARGE PLANNING  Goal: Discharge to home or other facility with appropriate resources  Description: INTERVENTIONS:  - Identify barriers to discharge w/patient and caregiver  - Arrange for needed discharge resources and transportation as appropriate  - Identify discharge learning needs (meds, wound care, etc )  - Arrange for interpretive services to assist at discharge as needed  - Refer to Case Management Department for coordinating discharge planning if the patient needs post-hospital services based on physician/advanced practitioner order or complex needs related to functional status, cognitive ability, or social support system  Outcome: Progressing     Problem: Knowledge Deficit  Goal: Patient/family/caregiver demonstrates understanding of disease process, treatment plan, medications, and discharge instructions  Description: Complete learning assessment and assess knowledge base  Interventions:  - Provide teaching at level of understanding  - Provide teaching via preferred learning methods  Outcome: Progressing     Problem: MOBILITY - ADULT  Goal: Maintain or return to baseline ADL function  Description: INTERVENTIONS:  -  Assess patient's ability to carry out ADLs; assess patient's baseline for ADL function and identify physical deficits which impact ability to perform ADLs (bathing, care of mouth/teeth, toileting, grooming, dressing, etc )  - Assess/evaluate cause of self-care deficits   - Assess range of motion  - Assess patient's mobility; develop plan if impaired  - Assess patient's need for assistive devices and provide as appropriate  - Encourage maximum independence but intervene and supervise when necessary  - Involve family in performance of ADLs  - Assess for home care needs following discharge   - Consider OT consult to assist with ADL evaluation and planning for discharge  - Provide patient education as appropriate  Outcome: Progressing  Goal: Maintains/Returns to pre admission functional level  Description: INTERVENTIONS:  - Perform BMAT or MOVE assessment daily    - Set and communicate daily mobility goal to care team and patient/family/caregiver     - Collaborate with rehabilitation services on mobility goals if consulted  - Stand patient 2 times a day  - Out of bed to chair 3 times a day   - Out of bed for meals 3 times a day  - Out of bed for toileting  - Record patient progress and toleration of activity level   Outcome: Progressing     Problem: Potential for Falls  Goal: Patient will remain free of falls  Description: INTERVENTIONS:  - Educate patient/family on patient safety including physical limitations  - Instruct patient to call for assistance with activity   - Consult OT/PT to assist with strengthening/mobility   - Keep Call bell within reach  - Keep bed low and locked with side rails adjusted as appropriate  - Keep care items and personal belongings within reach  - Initiate and maintain comfort rounds  - Make Fall Risk Sign visible to staff  - Offer Toileting every 2 Hours, in advance of need  - Initiate/Maintain bed/chair alarm  - Apply yellow socks and bracelet for high fall risk patients  - Consider moving patient to room near nurses station  Outcome: Progressing     Problem: SKIN/TISSUE INTEGRITY - ADULT  Goal: Incision(s), wounds(s) or drain site(s) healing without S/S of infection  Description: INTERVENTIONS  - Assess and document dressing, incision, wound bed, drain sites and surrounding tissue  - Provide patient and family education  - Perform skin care/dressing changes every       Outcome: Progressing     Problem: MUSCULOSKELETAL - ADULT  Goal: Maintain or return mobility to safest level of function  Description: INTERVENTIONS:  - Assess patient's ability to carry out ADLs; assess patient's baseline for ADL function and identify physical deficits which impact ability to perform ADLs (bathing, care of mouth/teeth, toileting, grooming, dressing, etc )  - Assess/evaluate cause of self-care deficits   - Assess range of motion  - Assess patient's mobility  - Assess patient's need for assistive devices and provide as appropriate  - Encourage maximum independence but intervene and supervise when necessary  - Involve family in performance of ADLs  - Assess for home care needs following discharge   - Consider OT consult to assist with ADL evaluation and planning for discharge  - Provide patient education as appropriate  Outcome: Progressing

## 2022-05-02 NOTE — PROGRESS NOTES
Progress Note - Orthopedics   Lotus Halsted 40 y o  male MRN: [id number]  Unit/Bed#: -01 Encounter: [id number]    Assessment:  POD #2 prepatellar bursal excision and drainage; ambulatory dysfunction    Plan:  Continue current treatment plan  Remaining packing was removed  Dressings will be changed tomorrow  Weight bearing:  WBAT        Subjective:  Grecia President reports decreasing pain  He denies systemic symptoms  Vitals: Blood pressure 114/79, pulse 89, temperature 97 9 °F (36 6 °C), resp  rate 21, height 6' (1 829 m), weight 84 kg (185 lb 3 oz), SpO2 95 %  ,Body mass index is 25 12 kg/m²  Intake/Output Summary (Last 24 hours) at 5/2/2022 1248  Last data filed at 5/2/2022 7142  Gross per 24 hour   Intake 300 ml   Output 2075 ml   Net -1775 ml       Invasive Devices  Report    Peripheral Intravenous Line            Peripheral IV 05/01/22 Left Forearm 1 day                Physical Exam:  Right lower extremity exam demonstrates the knee immobilizer dressings in place  The immobilizer was opened  The anterior knee was exposed by elevating the dressings loosely and then the remaining packing removed, consisting of approximately 8 in of packing  Dressings were recent cured and the knee immobilizer reapplied  He denies tenderness to palpation of calf compartments  Slight swelling is noted in the calf  Distal sensation is intact      Lab, Imaging and other studies:   CBC:   Lab Results   Component Value Date    WBC 9 91 05/02/2022    HGB 12 2 05/02/2022    HCT 37 7 05/02/2022    MCV 98 05/02/2022     05/02/2022    MCH 31 7 05/02/2022    MCHC 32 4 05/02/2022    RDW 13 0 05/02/2022    MPV 9 1 05/02/2022    NRBC 0 05/02/2022     CMP:   Lab Results   Component Value Date    SODIUM 137 05/02/2022     05/02/2022    CO2 27 05/02/2022    BUN 17 05/02/2022    CREATININE 0 79 05/02/2022    CALCIUM 7 7 (L) 05/02/2022    AST 32 05/02/2022    ALT 44 05/02/2022    ALKPHOS 100 05/02/2022    EGFR 108 05/02/2022     Tissue culture demonstrated beta-hemolytic group G strep    MRSA culture positive

## 2022-05-03 LAB
B BURGDOR IGG+IGM SER-ACNC: 12
BACTERIA SPEC ANAEROBE CULT: NO GROWTH
BACTERIA SPEC ANAEROBE CULT: NORMAL
BACTERIA SPEC ANAEROBE CULT: NORMAL
BACTERIA SPEC BFLD CULT: NO GROWTH
BACTERIA SPEC BFLD CULT: NO GROWTH
BACTERIA TISS AEROBE CULT: ABNORMAL
GRAM STN SPEC: ABNORMAL
GRAM STN SPEC: ABNORMAL
GRAM STN SPEC: NORMAL

## 2022-05-03 PROCEDURE — 99024 POSTOP FOLLOW-UP VISIT: CPT | Performed by: ORTHOPAEDIC SURGERY

## 2022-05-03 PROCEDURE — 99232 SBSQ HOSP IP/OBS MODERATE 35: CPT | Performed by: FAMILY MEDICINE

## 2022-05-03 RX ADMIN — OXYCODONE HYDROCHLORIDE AND ACETAMINOPHEN 2 TABLET: 5; 325 TABLET ORAL at 04:50

## 2022-05-03 RX ADMIN — AMPICILLIN SODIUM 2000 MG: 2 INJECTION, POWDER, FOR SOLUTION INTRAMUSCULAR; INTRAVENOUS at 18:37

## 2022-05-03 RX ADMIN — CEFEPIME HYDROCHLORIDE 2000 MG: 2 INJECTION, SOLUTION INTRAVENOUS at 11:05

## 2022-05-03 RX ADMIN — DOCUSATE SODIUM AND SENNOSIDES 1 TABLET: 8.6; 5 TABLET, FILM COATED ORAL at 22:36

## 2022-05-03 RX ADMIN — VANCOMYCIN HYDROCHLORIDE 1500 MG: 1 INJECTION, POWDER, LYOPHILIZED, FOR SOLUTION INTRAVENOUS at 02:38

## 2022-05-03 RX ADMIN — ENOXAPARIN SODIUM 40 MG: 40 INJECTION SUBCUTANEOUS at 22:36

## 2022-05-03 RX ADMIN — VANCOMYCIN HYDROCHLORIDE 1500 MG: 1 INJECTION, POWDER, LYOPHILIZED, FOR SOLUTION INTRAVENOUS at 11:43

## 2022-05-03 RX ADMIN — OXYCODONE HYDROCHLORIDE AND ACETAMINOPHEN 2 TABLET: 5; 325 TABLET ORAL at 19:29

## 2022-05-03 NOTE — CONSULTS
REQUIRED DOCUMENTATION:     1  This service was provided via Telemedicine  2  Provider located at [facility]  3  TeleMed provider: Octavia Fagan MD   4  Identify all parties in room with patient during tele consult:  Patient, RN  5  After connecting through [employer], patient was identified by name and date of birth and assistant checked wristband  Patient was then informed that this was a Telemedicine visit and that the exam was being conducted confidentially over secure lines  My office door was closed  No one else was in the room  Patient acknowledged consent and understanding of privacy and security of the Telemedicine visit, and gave us permission to have the assistant stay in the room in order to assist with the history and to conduct the exam   I informed the patient that I have reviewed their record in Epic and presented the opportunity for them to ask any questions regarding the visit today  The patient agreed to participate  TeleConsultation - Infectious Disease   Valeriy Zheng 40 y o  male MRN: [id number]  Unit/Bed#: -01 Encounter: [id number]      IMPRESSION & RECOMMENDATIONS:     1  Right knee prepatellar bursitis  Unclear initial source of infection, may be related to microtrauma to knee from [employer] work  Status post I&D 4/30 with purulent, necrotic tissue debrided from prepatellar bursa  Culture is growing Group G strep  Exam, synovial fluid analysis, and OR findings not consistent with septic arthritis  Synovial fluid aspirate with 330 WBC, culture from the ED and OR shows no growth  CT shows no significant joint effusion  The patient is clinically stable with improving pain and leukocytosis     -stop IV Vancomycin and cefepime   -start IV Ampicillin 2g q6hr   -when cleared for discharge from orthopedic surgery, transition to amoxicillin 500mg PO q8hr to complete a 10 day total course of antibiotics from I&D, through 5/9/22   -monitor exam, if not improving than recommend MRI of the right knee   -orthopedic surgery follow up    2  Leukocytosis  POA  Due to #1 above  Now improving after I&D, antibiotics  Blood cultures no growth    -antibiotics as above   -monitor CBC, fever curve    3  Hyponatremia  POA, resolved  Extensive review of the medical records in [facility] was performed including review of the notes, radiographs, and laboratory results    Discussed the above management plan in detail with Dr Hi Arcos of orthopedic surgery and [facility] provider Dr Nancy Jean  ID will follow  I spent 45 minutes in evaluation of the patient of which 25 minutes was in counseling/coordination of care    HISTORY OF PRESENT ILLNESS:  Reason for Consult: prepatellar bursitis  HPI: Nelia Willard is a 40 y o  healthy male who presented to the [address] [place] [address] ED 4/29/22 with acute onset right knee swelling and erythema the day prior to admission  He denied trauma or cuts of the knee  No fever, chills, nausea, vomiting  He notes that the right knee was swollen with ambulation and rest  The patient works in [employer], mainly doing black mold removal  He states he does not spend a significant amount of time on his knees  On evaluation, the patient was afebrile  Labs were significant for leukocytosis with a WBC count 16 6  The ED provider performed aspirated 3cc clear yellow synovial fluid  This showed only 331 WBC, and culture has shown no growth  CT of the right lower extremity showed anterior right knee cellulitis with prepatellar bursitis, no significant knee joint effusion  The patient was started on cefepime and vancomycin  He was taken to the OR with orthopedic surgery 4/30 from I&D  5cc blood tinged fluid was aspirated from the knee joint; there was no purulence  There was purulence and necrotic tissue encountered in the prepatellar bursitis which was debrided  There was no tendon or bone involvement  I confirmed findings with Dr Hi Arcos  Culture of the right knee aspirate shows no growth   Tissue and aspirate culture from the bursa is growing Group G strep  ID is consulted for further management  On evaluation, the patient states that his pain is improving, although he has not been ambulating  He denies fever, chills  Tolerating IV antibiotics without difficulty  Leukocytosis has improved  REVIEW OF SYSTEMS:  A complete 12 point system-based review of systems is negative other than that noted in the HPI  PAST MEDICAL HISTORY:  History reviewed  No pertinent past medical history  Past Surgical History:   Procedure Laterality Date    [place] KNEE SCOPE,CLEAN/DRAIN Right 2022    Procedure: I&D;  Surgeon: Sara Cullen MD;  Location:  MAIN OR;  Service: Orthopedics       FAMILY HISTORY:  Non-contributory    SOCIAL HISTORY:  Social History   Social History     Substance and Sexual Activity   Alcohol Use Never     Social History     Substance and Sexual Activity   Drug Use Not Currently    Types: Methamphetamines    Comment: taking methamphetamines for pain control     Social History     Tobacco Use   Smoking Status Current Every Day Smoker    Packs/day: 1 00    Types: Cigarettes   Smokeless Tobacco Never Used       ALLERGIES:  No Known Allergies    MEDICATIONS:  All current active medications have been reviewed      PHYSICAL EXAM:  Temp:  [97 8 °F (36 6 °C)-98 9 °F (37 2 °C)] 98 2 °F (36 8 °C)  HR:  [] 83  Resp:  [14-19] 19  BP: (114-125)/(73-78) 125/78  SpO2:  [94 %-96 %] 96 %  Temp (24hrs), Av 3 °F (36 8 °C), Min:97 8 °F (36 6 °C), Max:98 9 °F (37 2 °C)  Current: Temperature: 98 2 °F (36 8 °C)    Intake/Output Summary (Last 24 hours) at 5/3/2022 1543  Last data filed at 5/3/2022 1540  Gross per 24 hour   Intake 1080 ml   Output 3725 ml   Net -2645 ml       Documented physical exam has been primarily done by the patient's nurse and/or the primary service due to limited examination abilities on telemedicine    General Appearance:  Appearing well, nontoxic, and in no distress   Head:  Normocephalic, without obvious abnormality, atraumatic   Eyes:  Conjunctiva pink and sclera anicteric, both eyes   Nose: Nares normal, mucosa normal, no drainage   Throat: Oropharynx moist without lesions   Neck: Supple, symmetrical, no adenopathy   Back:   Symmetric, no curvature, ROM normal, no CVA tenderness   Lungs:   Clear to auscultation bilaterally, respirations unlabored   Chest Wall:  No tenderness or deformity   Heart:  RRR; no murmur, rub or gallop   Abdomen:   Soft, non-tender, non-distended, positive bowel sounds,    Extremities: Right lower extremity with surgical dressing in place   Skin: No rashes or lesions  No draining wounds noted  Lymph nodes: Cervical, supraclavicular nodes normal   Neurologic: Alert and oriented times 3, extremity strength 5/5 and symmetric       LABS, IMAGING, & OTHER STUDIES:  Lab Results:  I have personally reviewed pertinent labs  Results from last 7 days   Lab Units 05/02/22  0448 04/30/22  0525 04/29/22  0705   WBC Thousand/uL 9 91 15 91* 16 68*   HEMOGLOBIN g/dL 12 2 13 8 14 4   PLATELETS Thousands/uL 340 325 330     Results from last 7 days   Lab Units 05/02/22  0448 04/30/22  0525 04/30/22  0525   POTASSIUM mmol/L 3 9   < > 3 8   CHLORIDE mmol/L 103   < > 101   CO2 mmol/L 27   < > 28   BUN mg/dL 17   < > 14   CREATININE mg/dL 0 79   < > 0 96   EGFR ml/min/1 73sq m 108   < > 95   CALCIUM mg/dL 7 7*   < > 9 3   AST U/L 32  --  15   ALT U/L 44   < > 35   ALK PHOS U/L 100   < > 89    < > = values in this interval not displayed  Results from last 7 days   Lab Units 04/29/22  0705   PROCALCITONIN ng/ml 0 16     Results from last 7 days   Lab Units 04/29/22  0705   CRP mg/L 142 4*               Micro:  Results from last 7 days   Lab Units 04/30/22  0908 04/30/22  0906 04/30/22  0905 04/30/22  0029 04/29/22 2027 04/29/22  1908   BLOOD CULTURE   --   --   --   --   --  No Growth at 72 hrs  No Growth at 72 hrs     GRAM STAIN RESULT  1+ Polys  No bacteria seen Rare Polys  No bacteria seen 1+ Polys*  1+ Gram positive cocci in pairs and chains*  --  Rare Polys  No bacteria seen  --    BODY FLUID CULTURE, STERILE  No growth  --  2+ Growth of Beta Hemolytic Streptococcus Group G*  --  No growth  --    MRSA CULTURE ONLY   --   --   --  Methicillin Resistant Staphylococcus aureus isolated*  --   --        Imaging Studies:   I have personally reviewed pertinent imaging study reports and images in PACS  CT right lower extremity:  Anterior right knee cellulitis with prepatellar bursitis  No significant knee joint effusion  Other Studies:   I have personally reviewed pertinent reports

## 2022-05-03 NOTE — PROGRESS NOTES
Vancomycin IV Pharmacy-to-Dose Consultation    Esme Joyce is a 40 y o  male who is currently receiving Vancomycin IV with management by the Pharmacy Consult service  Assessment/Plan:  The patient was reviewed  Renal function is stable and no signs or symptoms of nephrotoxicity and/or infusion reactions were documented in the chart  Based on todays assessment, continue current vancomycin (day # 4) dosing of 1500 mg IV q 8 hrs, with a plan for trough to be drawn at 1900 on 5/6/22  We will continue to follow the patients culture results and clinical progress daily      Jerrica Bernstein, Pharmacist

## 2022-05-03 NOTE — PLAN OF CARE
Problem: PAIN - ADULT  Goal: Verbalizes/displays adequate comfort level or baseline comfort level  Description: Interventions:  - Encourage patient to monitor pain and request assistance  - Assess pain using appropriate pain scale  - Administer analgesics based on type and severity of pain and evaluate response  - Implement non-pharmacological measures as appropriate and evaluate response  - Consider cultural and social influences on pain and pain management  - Notify physician/advanced practitioner if interventions unsuccessful or patient reports new pain  Outcome: Progressing     Problem: INFECTION - ADULT  Goal: Absence or prevention of progression during hospitalization  Description: INTERVENTIONS:  - Assess and monitor for signs and symptoms of infection  - Monitor lab/diagnostic results  - Monitor all insertion sites, i e  indwelling lines, tubes, and drains  - Monitor endotracheal if appropriate and nasal secretions for changes in amount and color  - [place] appropriate cooling/warming therapies per order  - Administer medications as ordered  - Instruct and encourage patient and family to use good hand hygiene technique  - Identify and instruct in appropriate isolation precautions for identified infection/condition  Outcome: Progressing     Problem: SAFETY ADULT  Goal: Patient will remain free of falls  Description: INTERVENTIONS:  - Educate patient/family on patient safety including physical limitations  - Instruct patient to call for assistance with activity   - Consult OT/PT to assist with strengthening/mobility   - Keep Call bell within reach  - Keep bed low and locked with side rails adjusted as appropriate  - Keep care items and personal belongings within reach  - Initiate and maintain comfort rounds  - Make Fall Risk Sign visible to staff  - Offer Toileting every 2 Hours, in advance of need  - Initiate/Maintain bed/chair alarm  - Apply yellow socks and bracelet for high fall risk patients  - Consider moving patient to room near nurses station  Outcome: Progressing  Goal: Maintain or return to baseline ADL function  Description: INTERVENTIONS:  -  Assess patient's ability to carry out ADLs; assess patient's baseline for ADL function and identify physical deficits which impact ability to perform ADLs (bathing, care of mouth/teeth, toileting, grooming, dressing, etc )  - Assess/evaluate cause of self-care deficits   - Assess range of motion  - Assess patient's mobility; develop plan if impaired  - Assess patient's need for assistive devices and provide as appropriate  - Encourage maximum independence but intervene and supervise when necessary  - Involve family in performance of ADLs  - Assess for home care needs following discharge   - Consider OT consult to assist with ADL evaluation and planning for discharge  - Provide patient education as appropriate  Outcome: Progressing  Goal: Maintains/Returns to pre admission functional level  Description: INTERVENTIONS:  - Perform BMAT or MOVE assessment daily    - Set and communicate daily mobility goal to care team and patient/family/caregiver     - Collaborate with rehabilitation services on mobility goals if consulted  - Stand patient 2 times a day  - Out of bed to chair 3 times a day   - Out of bed for meals 3 times a day  - Out of bed for toileting  - Record patient progress and toleration of activity level   Outcome: Progressing     Problem: DISCHARGE PLANNING  Goal: Discharge to home or other facility with appropriate resources  Description: INTERVENTIONS:  - Identify barriers to discharge w/patient and caregiver  - Arrange for needed discharge resources and transportation as appropriate  - Identify discharge learning needs (meds, wound care, etc )  - Arrange for interpretive services to assist at discharge as needed  - Refer to Case Management Department for coordinating discharge planning if the patient needs post-hospital services based on physician/advanced practitioner order or complex needs related to functional status, cognitive ability, or social support system  Outcome: Progressing     Problem: Knowledge Deficit  Goal: Patient/family/caregiver demonstrates understanding of disease process, treatment plan, medications, and discharge instructions  Description: Complete learning assessment and assess knowledge base  Interventions:  - Provide teaching at level of understanding  - Provide teaching via preferred learning methods  Outcome: Progressing     Problem: MOBILITY - ADULT  Goal: Maintain or return to baseline ADL function  Description: INTERVENTIONS:  -  Assess patient's ability to carry out ADLs; assess patient's baseline for ADL function and identify physical deficits which impact ability to perform ADLs (bathing, care of mouth/teeth, toileting, grooming, dressing, etc )  - Assess/evaluate cause of self-care deficits   - Assess range of motion  - Assess patient's mobility; develop plan if impaired  - Assess patient's need for assistive devices and provide as appropriate  - Encourage maximum independence but intervene and supervise when necessary  - Involve family in performance of ADLs  - Assess for home care needs following discharge   - Consider OT consult to assist with ADL evaluation and planning for discharge  - Provide patient education as appropriate  Outcome: Progressing  Goal: Maintains/Returns to pre admission functional level  Description: INTERVENTIONS:  - Perform BMAT or MOVE assessment daily    - Set and communicate daily mobility goal to care team and patient/family/caregiver     - Collaborate with rehabilitation services on mobility goals if consulted  - Stand patient 2 times a day  - Out of bed to chair 3 times a day   - Out of bed for meals 3 times a day  - Out of bed for toileting  - Record patient progress and toleration of activity level   Outcome: Progressing     Problem: Potential for Falls  Goal: Patient will remain free of falls  Description: INTERVENTIONS:  - Educate patient/family on patient safety including physical limitations  - Instruct patient to call for assistance with activity   - Consult OT/PT to assist with strengthening/mobility   - Keep Call bell within reach  - Keep bed low and locked with side rails adjusted as appropriate  - Keep care items and personal belongings within reach  - Initiate and maintain comfort rounds  - Make Fall Risk Sign visible to staff  - Offer Toileting every 2 Hours, in advance of need  - Initiate/Maintain bed/chair alarm  - Apply yellow socks and bracelet for high fall risk patients  - Consider moving patient to room near nurses station  Outcome: Progressing     Problem: SKIN/TISSUE INTEGRITY - ADULT  Goal: Incision(s), wounds(s) or drain site(s) healing without S/S of infection  Description: INTERVENTIONS  - Assess and document dressing, incision, wound bed, drain sites and surrounding tissue  - Provide patient and family education  - Perform skin care/dressing changes every       Outcome: Progressing     Problem: MUSCULOSKELETAL - ADULT  Goal: Maintain or return mobility to safest level of function  Description: INTERVENTIONS:  - Assess patient's ability to carry out ADLs; assess patient's baseline for ADL function and identify physical deficits which impact ability to perform ADLs (bathing, care of mouth/teeth, toileting, grooming, dressing, etc )  - Assess/evaluate cause of self-care deficits   - Assess range of motion  - Assess patient's mobility  - Assess patient's need for assistive devices and provide as appropriate  - Encourage maximum independence but intervene and supervise when necessary  - Involve family in performance of ADLs  - Assess for home care needs following discharge   - Consider OT consult to assist with ADL evaluation and planning for discharge  - Provide patient education as appropriate  Outcome: Progressing

## 2022-05-03 NOTE — PROGRESS NOTES
Vancomycin IV Pharmacy-to-Dose Consultation    Maxwell Lockett is a 40 y o  male who is currently receiving Vancomycin IV with management by the Pharmacy Consult service  Assessment/Plan:  The patient was reviewed  Renal function is stable, trough from 5/2/22  @ 1900 was 16 3 therapeutic, and no signs or symptoms of nephrotoxicity and/or infusion reactions were documented in the chart  Based on todays assessment, continue current vancomycin (day # 5) dosing of 1500 mg iv q 8 hrs, with a plan for trough to be drawn at 1900 on 5/6/22  We will continue to follow the patients culture results and clinical progress daily      Renuka Schofield, Pharmacist

## 2022-05-03 NOTE — PROGRESS NOTES
Progress Note - Orthopedics   Jennifer Lowers 40 y o  male MRN: [id number]  Unit/Bed#: -01 Encounter: [id number]    Assessment:  POD #3 excision septic prepatellar bursa with drainage; ambulatory dysfunction    Plan:  Continue current treatment plan ID note reviewed  Dressings changed today and clinical images of the knee placed in the chart  I would anticipate likely discharge tomorrow  Weight bearing:  WBAT        Subjective:  Hansa Ramos reports decreasing pain and increasing tolerance to activity  His primary complaint was the discomfort from the knee immobilizer  He denies any systemic symptoms  Vitals: Blood pressure 125/78, pulse 83, temperature 98 2 °F (36 8 °C), resp  rate 19, height 6' (1 829 m), weight 84 kg (185 lb 3 oz), SpO2 96 %  ,Body mass index is 25 12 kg/m²  Intake/Output Summary (Last 24 hours) at 5/3/2022 1615  Last data filed at 5/3/2022 1540  Gross per 24 hour   Intake 1080 ml   Output 3725 ml   Net -2645 ml       Invasive Devices  Report    Peripheral Intravenous Line            Peripheral IV 05/03/22 Distal;Dorsal (posterior); Left Forearm <1 day                Physical Exam:  The right lower extremity dressings were changed  Clinical images of the knee obtained  The incision is benign with stitches in place  There is a minimal degree of bloody drainage  Calf compartments are soft and nontender  Distal motor and sensory exams are intact  Lab, Imaging and other studies:  Infectious disease note reviewed

## 2022-05-03 NOTE — ASSESSMENT & PLAN NOTE
· As per operative finding notes:Purulence from the prepatellar bursa incision drainage necrotic bursa under the fascia excised completely tendon intact bone intact wound left open after washout cultures obtained deep  · POD #3 excision septic prepatellar bursa with drainage; ambulatory dysfunction  · I discussed with infectious disease were discussed with orthopedics no concern for septic joint  Strep on the cultures  Switched to ampicillin  Anticipate discharge tomorrow if okay per Ortho on amoxicillin

## 2022-05-03 NOTE — PROGRESS NOTES
[address]  Progress Note - Earnestine Ley 1975, 40 y o  male MRN: [id number]  Unit/Bed#: -[facility] Encounter: [id number]  Primary Care Provider: No primary care provider on file  Date and time admitted to hospital: 2022  6:47 PM    Hyponatremia  Assessment & Plan  · Has since resolved    Leukocytosis  Assessment & Plan  · WBC 16 on admission   · Secondary to infected prepatellar bursitis  Resolved    * Prepatellar bursitis of right knee  Assessment & Plan  · As per operative finding notes:Purulence from the prepatellar bursa incision drainage necrotic bursa under the fascia excised completely tendon intact bone intact wound left open after washout cultures obtained deep  · POD #3 excision septic prepatellar bursa with drainage; ambulatory dysfunction  · I discussed with infectious disease were discussed with orthopedics no concern for septic joint  Strep on the cultures  Switched to ampicillin  Anticipate discharge tomorrow if okay per Ortho on amoxicillin  VTE Pharmacologic Prophylaxis: VTE Score: 2 Low Risk (Score 0-2) - Encourage Ambulation  Patient Centered Rounds: I performed bedside rounds with nursing staff today  Discussions with Specialists or Other Care Team Provider: will discuss with ID    Education and Discussions with Family / Patient: patient    Time Spent for Care: 30 minutes  More than 50% of total time spent on counseling and coordination of care as described above  Current Length of Stay: 3 day(s)  Current Patient Status: Inpatient   Certification Statement: The patient will continue to require additional inpatient hospital stay due to bursa infection  Discharge Plan: Anticipate discharge in 24-48 hrs to discharge location to be determined pending rehab evaluations      Code Status: Level 1 - Full Code    Subjective:   Seen and examined no complaints    Objective:     Vitals:   Temp (24hrs), Av 3 °F (36 8 °C), Min:97 8 °F (36 6 °C), Max:98 9 °F (37 2 °C)    Temp:  [97 8 °F (36 6 °C)-98 9 °F (37 2 °C)] 98 2 °F (36 8 °C)  HR:  [] 83  Resp:  [14-19] 19  BP: (114-125)/(73-78) 125/78  SpO2:  [94 %-96 %] 96 %  Body mass index is 25 12 kg/m²  Input and Output Summary (last 24 hours): Intake/Output Summary (Last 24 hours) at 5/3/2022 1720  Last data filed at 5/3/2022 1540  Gross per 24 hour   Intake 1080 ml   Output 3025 ml   Net -1945 ml       Physical Exam:   Physical Exam  Vitals and nursing note reviewed  Constitutional:       Appearance: He is well-developed  HENT:      Head: Normocephalic and atraumatic  Eyes:      Conjunctiva/sclera: Conjunctivae normal    Cardiovascular:      Rate and Rhythm: Normal rate and regular rhythm  Heart sounds: No murmur heard  Pulmonary:      Effort: Pulmonary effort is normal  No respiratory distress  Breath sounds: Normal breath sounds  No wheezing or rales  Abdominal:      Palpations: Abdomen is soft  Tenderness: There is no abdominal tenderness  Musculoskeletal:         General: Swelling (right lower extremity) present  Cervical back: Neck supple  Skin:     General: Skin is warm and dry  Neurological:      Mental Status: He is alert and oriented to person, place, and time           Additional Data:     Labs:  Results from last 7 days   Lab Units 05/02/22  0448   WBC Thousand/uL 9 91   HEMOGLOBIN g/dL 12 2   HEMATOCRIT % 37 7   PLATELETS Thousands/uL 340   NEUTROS PCT % 57   LYMPHS PCT % 22   MONOS PCT % 14*   EOS PCT % 5     Results from last 7 days   Lab Units 05/02/22  0448   SODIUM mmol/L 137   POTASSIUM mmol/L 3 9   CHLORIDE mmol/L 103   CO2 mmol/L 27   BUN mg/dL 17   CREATININE mg/dL 0 79   ANION GAP mmol/L 7   CALCIUM mg/dL 7 7*   ALBUMIN g/dL 2 2*   TOTAL BILIRUBIN mg/dL 0 26   ALK PHOS U/L 100   ALT U/L 44   AST U/L 32   GLUCOSE RANDOM mg/dL 104     Results from last 7 days   Lab Units 04/30/22  0525   INR  0 94             Results from last 7 days   Lab Units 04/29/22  1905 04/29/22  0705   LACTIC ACID mmol/L 1 9  --    PROCALCITONIN ng/ml  --  0 16       Lines/Drains:  Invasive Devices  Report    Peripheral Intravenous Line            Peripheral IV 05/03/22 Distal;Dorsal (posterior); Left Forearm <1 day                      Imaging: Reviewed radiology reports from this admission including: xray(s)    Recent Cultures (last 7 days):   Results from last 7 days   Lab Units 04/30/22  0908 04/30/22  0906 04/30/22  0905 04/29/22 2027 04/29/22  1908   BLOOD CULTURE   --   --   --   --  No Growth at 72 hrs  No Growth at 72 hrs  GRAM STAIN RESULT  1+ Polys  No bacteria seen Rare Polys  No bacteria seen 1+ Polys*  1+ Gram positive cocci in pairs and chains* Rare Polys  No bacteria seen  --    BODY FLUID CULTURE, STERILE  No growth  --  2+ Growth of Beta Hemolytic Streptococcus Group G* No growth  --        Last 24 Hours Medication List:   Current Facility-Administered Medications   Medication Dose Route Frequency Provider Last Rate    acetaminophen  650 mg Oral Q6H PRN Nasrin Marr MD      ampicillin  2,000 mg Intravenous Q6H Marcello Winston MD      enoxaparin  40 mg Subcutaneous Q24H Jyoti Thapa MD      HYDROmorphone  0 2 mg Intravenous Q4H PRN Mela Henderson MD      lidocaine  1 patch Topical Daily Mela Henderson MD      morphine injection  2 mg Intravenous Q4H PRN Nasrin Marr MD      naloxone  0 04 mg Intravenous Q1MIN PRN Mela Henderson MD      nicotine  1 patch Transdermal Daily Nasrin Marr MD      ondansetron  4 mg Intravenous Q6H PRN Nasrin Marr MD      oxyCODONE-acetaminophen  2 tablet Oral Q4H PRN Nasrin Marr MD      polyethylene glycol  17 g Oral Daily Mela Henderson MD      senna-docusate sodium  1 tablet Oral HS Mela Henderson MD          Today, Patient Was Seen By: Gabriella Muller MD    **Please Note: This note may have been constructed using a voice recognition system  **

## 2022-05-04 VITALS
SYSTOLIC BLOOD PRESSURE: 106 MMHG | HEART RATE: 95 BPM | WEIGHT: 185.19 LBS | RESPIRATION RATE: 16 BRPM | BODY MASS INDEX: 25.08 KG/M2 | HEIGHT: 72 IN | OXYGEN SATURATION: 97 % | DIASTOLIC BLOOD PRESSURE: 68 MMHG | TEMPERATURE: 97.7 F

## 2022-05-04 LAB
BACTERIA BLD CULT: NORMAL
BACTERIA BLD CULT: NORMAL

## 2022-05-04 PROCEDURE — 99024 POSTOP FOLLOW-UP VISIT: CPT | Performed by: ORTHOPAEDIC SURGERY

## 2022-05-04 PROCEDURE — 99239 HOSP IP/OBS DSCHRG MGMT >30: CPT | Performed by: FAMILY MEDICINE

## 2022-05-04 RX ORDER — ASPIRIN 325 MG
325 TABLET, DELAYED RELEASE (ENTERIC COATED) ORAL 2 TIMES DAILY
Qty: 60 TABLET | Refills: 0 | Status: SHIPPED | OUTPATIENT
Start: 2022-05-04 | End: 2022-06-03

## 2022-05-04 RX ORDER — AMOXICILLIN 500 MG/1
500 TABLET, FILM COATED ORAL EVERY 8 HOURS
Qty: 15 TABLET | Refills: 0 | Status: SHIPPED | OUTPATIENT
Start: 2022-05-04 | End: 2022-05-09

## 2022-05-04 RX ORDER — OXYCODONE HYDROCHLORIDE AND ACETAMINOPHEN 5; 325 MG/1; MG/1
2 TABLET ORAL EVERY 4 HOURS PRN
Qty: 30 TABLET | Refills: 0 | Status: SHIPPED | OUTPATIENT
Start: 2022-05-04 | End: 2022-05-14

## 2022-05-04 RX ADMIN — AMPICILLIN SODIUM 2000 MG: 2 INJECTION, POWDER, FOR SOLUTION INTRAMUSCULAR; INTRAVENOUS at 01:55

## 2022-05-04 RX ADMIN — AMPICILLIN SODIUM 2000 MG: 2 INJECTION, POWDER, FOR SOLUTION INTRAMUSCULAR; INTRAVENOUS at 06:07

## 2022-05-04 RX ADMIN — AMPICILLIN SODIUM 2000 MG: 2 INJECTION, POWDER, FOR SOLUTION INTRAMUSCULAR; INTRAVENOUS at 13:29

## 2022-05-04 NOTE — TELEPHONE ENCOUNTER
Manoj Late called from on call provider, regarding a clarification for medication that pt need since they were just discharged today from hospital  Provider was paged via [facility]

## 2022-05-04 NOTE — DISCHARGE SUMMARY
[address]  Discharge- Brenna Lindy 1975, 40 y o  male MRN: [id number]  Unit/Bed#: -[facility]yna Encounter: [id number]  Primary Care Provider: No primary care provider on file  Date and time admitted to hospital: 4/29/2022  6:47 PM    Hyponatremia  Assessment & Plan  · Has since resolved    Leukocytosis  Assessment & Plan  · WBC 16 on admission   · Secondary to infected prepatellar bursitis  Resolved    * Prepatellar bursitis of right knee  Assessment & Plan  · As per operative finding notes:Purulence from the prepatellar bursa incision drainage necrotic bursa under the fascia excised completely tendon intact bone intact wound left open after washout cultures obtained deep  · POD #3 excision septic prepatellar bursa with drainage; ambulatory dysfunction  · I discussed with infectious disease were discussed with orthopedics no concern for septic joint  Strep on the cultures  Discussed with orthopedics okay to discharge he has a follow-up on Tuesday with Dr Emerald Younger switched to amoxicillin 500 mg p o  Q 8 hours till May night he will be on aspirin 325 mg p o  B i d  For 30 days for DVT prophylaxis  Will give him Percocet for pain          Medical Problems             Resolved Problems  Date Reviewed: 5/4/2022    None              Discharging Physician / Practitioner: Cahrla Hudson MD  PCP: No primary care provider on file    Admission Date:   Admission Orders (From admission, onward)     Ordered        04/30/22 1215  Inpatient Admission  Once            04/29/22 2259  Place in Observation  Once                      Discharge Date: 05/04/22    Consultations During Hospital Stay:  · Ortho  · ID    Procedures Performed:   ARTHROSCOPY KNEE (Right)       Significant Findings / Test Results:   · CT lower extremity without contrast right-Please note that evaluation for a focal fluid collection is significantly limited without intravenous contrast material        Findings consistent with anterior cellulitis and possible prepatellar bursitis (septic versus aseptic)  No radiopaque foreign body      No acute osseous abnormality  No significant knee joint effusion  Incidental Findings:   · None    Test Results Pending at Discharge (will require follow up): · None     Outpatient Tests Requested:  · none    Complications:  None    Reason for Admission:  Right knee pain swelling and erythema    Hospital Course:   Donaldo Glez is a 40 y o  male patient who originally presented to the hospital on 4/29/2022 due to prepatellar bursitis of the right knee underwent arthroscopy of the knee, patient started on IV antibiotics  During the OR there was no concern for septic joint and the intra op cultures are negative prior to that the cultures grew strep Infectious Disease evaluated transition to ampicillin will be discharged on amoxicillin 500 mg p o  Q 8 hours till May 9th  Dressing changes daily as stated by orthopedics  Aspirin b i d  For DVT prophylaxis for 30 days of follow-up with orthopedics next Tuesday otherwise medically clear to be discharged  No work till cleared by ortho        Please see above list of diagnoses and related plan for additional information  Condition at Discharge: stable    Discharge Day Visit / Exam:   Subjective:  Seen and examined no complaint of cp or sob or major knee pain  Vitals: Blood Pressure: 106/68 (05/04/22 0736)  Pulse: 95 (05/04/22 0736)  Temperature: 97 7 °F (36 5 °C) (05/04/22 0736)  Temp Source: Oral (04/30/22 0759)  Respirations: 16 (05/04/22 0736)  Height: 6' (182 9 cm) (04/29/22 2340)  Weight - Scale: 84 kg (185 lb 3 oz) (04/29/22 2340)  SpO2: 97 % (05/04/22 0736)  Exam:   Physical Exam  Vitals and nursing note reviewed  Constitutional:       Appearance: He is well-developed  HENT:      Head: Normocephalic and atraumatic  Eyes:      Conjunctiva/sclera: Conjunctivae normal    Cardiovascular:      Rate and Rhythm: Normal rate and regular rhythm  Heart sounds: No murmur heard  Pulmonary:      Effort: Pulmonary effort is normal  No respiratory distress  Breath sounds: Normal breath sounds  Abdominal:      Palpations: Abdomen is soft  Tenderness: There is no abdominal tenderness  Musculoskeletal:         General: Swelling (minimal right leg and in knee immobilizer) present  Cervical back: Neck supple  Comments: Left thigh posterior growth has been their for long time   Skin:     General: Skin is warm and dry  Neurological:      Mental Status: He is alert and oriented to person, place, and time  Discussion with Family:patient    Discharge instructions/Information to patient and family:   See after visit summary for information provided to patient and family  Provisions for Follow-Up Care:  See after visit summary for information related to follow-up care and any pertinent home health orders  Disposition:   Home    Planned Readmission: no     Discharge Statement:  I spent >35 minutes discharging the patient  This time was spent on the day of discharge  I had direct contact with the patient on the day of discharge  Greater than 50% of the total time was spent examining patient, answering all patient questions, arranging and discussing plan of care with patient as well as directly providing post-discharge instructions  Additional time then spent on discharge activities  Discharge Medications:  See after visit summary for reconciled discharge medications provided to patient and/or family        **Please Note: This note may have been constructed using a voice recognition system**

## 2022-05-04 NOTE — TELEPHONE ENCOUNTER
Was instructed to page provide that wrote pt's script since it's within office hours   Provider was paged via [facility]

## 2022-05-04 NOTE — CASE MANAGEMENT
Case Management Assessment & Discharge Planning Note    Patient name Maxwell Lockett  Location /-98 MRN [id number]  : 1975 Date 2022       Current Admission Date: 2022  Current Admission Diagnosis:Prepatellar bursitis of right knee   Patient Active Problem List    Diagnosis Date Noted    Prepatellar bursitis of right knee 2022    Effusion of right knee 2022    Leukocytosis 2022    Hyponatremia 2022      LOS (days): 4  Geometric Mean LOS (GMLOS) (days):   Days to GMLOS:     OBJECTIVE:    Risk of Unplanned Readmission Score: 10         Current admission status: Inpatient  Referral Reason:  (discharge planning  home health)    Preferred Pharmacy:   [address] [place] [address] [phone] [id number]  Phone: [phone] Fax: [phone]    Primary Care Provider: No primary care provider on file  Primary Insurance: [employer]ENDING  Secondary Insurance:     ASSESSMENT:    CM met with patient at the bedside,baseline information  was obtained  CM discussed the role of CM in helping the patient develop a discharge plan and assist the patient in carry out their plan  Patient stated he is not vaccinated for covid    Patient will be staying with friend Ellie Hahn term 3 steps into home  Patient agreed with home health see prior note  [facility]      Active Health Care Proxies    There are no active Health Care Proxies on file  Readmission Root Cause  30 Day Readmission: No    Patient Information  Admitted from[de-identified] Home  Mental Status: Alert  During Assessment patient was accompanied by: Friend  Assessment information provided by[de-identified] Brent Douglas - please comment (friend jovanna patient is to stay at her home short term)  Primary Caregiver:  Other (Comment)  Caregiver's Name[de-identified] Dangelo Salmon   [phone]  Caregiver's Relationship to Patient[de-identified] Friend  Support Systems: Friend Yasmin Ledesma)  [place] of Residence: [address] do you live in?: [address] entry access options   Select all that apply : Stairs  Number of steps to enter home : 3  Do the steps have railings?: Yes  Type of Current Residence: 2 story home  Upon entering residence, is there a bedroom on the main floor (no further steps)?: Yes  Upon entering residence, is there a bathroom on the main floor (no further steps)?: Yes  In the last 12 months, was there a time when you were not able to pay the mortgage or rent on time?: No  In the last 12 months, how many places have you lived?: 2 (staying with friend  Arthur Pfeiffer  [facility])  In the last 12 months, was there a time when you did not have a steady place to sleep or slept in a shelter (including now)?: No  Homeless/housing insecurity resource given?: N/A  Living Arrangements: Lives Alone    Activities of Daily Living Prior to Admission  Functional Status: Independent  Completes ADLs independently?: Yes  Ambulates independently?: Yes  Does patient use assisted devices?: No  Does patient currently own DME?: No  Does patient have a history of Outpatient Therapy (PT/OT)?: No  Does the patient have a history of Short-Term Rehab?: No  Does patient have a history of HHC?: No  Does patient currently have [place] [id number]?: No         Patient Information Continued  Income Source: Employed  Does patient have prescription coverage?: Yes  Within the past 12 months, you worried that your food would run out before you got the money to buy more : Never true  Within the past 12 months, the food you bought just didnt last and you didnt have money to get more : Never true  Food insecurity resource given?: N/A  Does patient receive dialysis treatments?: No         Means of Transportation  Means of Transport to Appts[de-identified] Drives Self  In the past 12 months, has lack of transportation kept you from medical appointments or from getting medications?: No  In the past 12 months, has lack of transportation kept you from meetings, work, or from getting things needed for daily living?: No  Was application for public transport provided?: N/A        DISCHARGE DETAILS:    Discharge planning discussed with[de-identified] patient and friend Olvin Huntley of Choice: Yes  Comments - Freedom of Choice: CM discussed  freedom of choice for home health servcies to assess wound at home  patient has no preferences ageed to placing referrals for acceptance  patient and friend Isamar Ruiz accepted  [facility] home health for soc 5/5/22  CM contacted family/caregiver?: Yes  Were Treatment Team discharge recommendations reviewed with patient/caregiver?: Yes  Did patient/caregiver verbalize understanding of patient care needs?: Yes  Were patient/caregiver advised of the risks associated with not following Treatment Team discharge recommendations?: Yes    Contacts  Relationship to Patient[de-identified] Friend  Contact Method:  In Person  Reason/Outcome: Discharge Planning              Other Referral/Resources/Interventions Provided:  Interventions: A         Treatment Team Recommendation: Home with [facility]  Discharge Destination Plan[de-identified] Home with [address] [place] [address] notified[facility]de-identified] [facility]

## 2022-05-04 NOTE — DISCHARGE INSTRUCTIONS
Ortho Discharge Instructions:    -Please maintain knee immobilizer at all times, untill seen in the office   -Call Dr Mera's office at  [phone] to schedule a follow up appointment on Tuesday, 5/10/2022   - Continue antibiotics per the medical team and Infectious Disease team  - Aspirin 325 mg enteric-coated once daily for 30 days for DVT prophylaxis  - Please changes dressings daily  You will need 4x4 gauze, ABD pads, and ace wrap, which can be found at your local pharmacy  Place gauze onto the knee, at the surgical incision  Cover with ABD pad, and then wrap with ace wrap, secure with tape

## 2022-05-04 NOTE — ASSESSMENT & PLAN NOTE
· As per operative finding notes:Purulence from the prepatellar bursa incision drainage necrotic bursa under the fascia excised completely tendon intact bone intact wound left open after washout cultures obtained deep  · POD #3 excision septic prepatellar bursa with drainage; ambulatory dysfunction  · I discussed with infectious disease were discussed with orthopedics no concern for septic joint  Strep on the cultures  Discussed with orthopedics okay to discharge he has a follow-up on Tuesday with Dr Jyoti Body switched to amoxicillin 500 mg p o  Q 8 hours till May night he will be on aspirin 325 mg p o  B i d  For 30 days for DVT prophylaxis    Will give him Percocet for pain

## 2022-05-04 NOTE — PLAN OF CARE
Problem: PAIN - ADULT  Goal: Verbalizes/displays adequate comfort level or baseline comfort level  Description: Interventions:  - Encourage patient to monitor pain and request assistance  - Assess pain using appropriate pain scale0-10  - Administer analgesics based on type and severity of pain and evaluate response  - Implement non-pharmacological measures as appropriate and evaluate response  - Consider cultural and social influences on pain and pain management  - Notify physician/advanced practitioner if interventions unsuccessful or patient reports new pain  Outcome: Progressing     Problem: INFECTION - ADULT  Goal: Absence or prevention of progression during hospitalization  Description: INTERVENTIONS:  - Assess and monitor for signs and symptoms of infection  - Monitor lab/diagnostic results  - Monitor all insertion sites, i e  indwelling lines, tubes, and drains  - Monitor endotracheal if appropriate and nasal secretions for changes in amount and color  - [place] appropriate cooling/warming therapies per order  - Administer medications as ordered  - Instruct and encourage patient and family to use good hand hygiene technique  - Identify and instruct in appropriate isolation precautions for identified infection/condition  Outcome: Progressing     Problem: SAFETY ADULT  Goal: Patient will remain free of falls  Description: INTERVENTIONS:  - Educate patient/family on patient safety including physical limitations  - Instruct patient to call for assistance with activity   - Consult OT/PT to assist with strengthening/mobility   - Keep Call bell within reach  - Keep bed low and locked with side rails adjusted as appropriate  - Keep care items and personal belongings within reach  - Initiate and maintain comfort rounds  - Make Fall Risk Sign visible to staff  - Offer Toileting every 2 Hours, in advance of need  - Initiate/Maintain bed/chair alarm  - Apply yellow socks and bracelet for high fall risk patients  - Consider moving patient to room near nurses station  Outcome: Progressing  Goal: Maintain or return to baseline ADL function  Description: INTERVENTIONS:  -  Assess patient's ability to carry out ADLs; assess patient's baseline for ADL function and identify physical deficits which impact ability to perform ADLs (bathing, care of mouth/teeth, toileting, grooming, dressing, etc )  - Assess/evaluate cause of self-care deficits   - Assess range of motion  - Assess patient's mobility; develop plan if impaired  - Assess patient's need for assistive devices and provide as appropriate  - Encourage maximum independence but intervene and supervise when necessary  - Involve family in performance of ADLs  - Assess for home care needs following discharge   - Consider OT consult to assist with ADL evaluation and planning for discharge  - Provide patient education as appropriate  Outcome: Progressing  Goal: Maintains/Returns to pre admission functional level  Description: INTERVENTIONS:  - Perform BMAT or MOVE assessment daily    - Set and communicate daily mobility goal to care team and patient/family/caregiver     - Collaborate with rehabilitation services on mobility goals if consulted  - Stand patient 2 times a day  - Out of bed to chair 3 times a day   - Out of bed for meals 3 times a day  - Out of bed for toileting  - Record patient progress and toleration of activity level   Outcome: Progressing     Problem: DISCHARGE PLANNING  Goal: Discharge to home or other facility with appropriate resources  Description: INTERVENTIONS:  - Identify barriers to discharge w/patient and caregiver  - Arrange for needed discharge resources and transportation as appropriate  - Identify discharge learning needs (meds, wound care, etc )  - Arrange for interpretive services to assist at discharge as needed  - Refer to Case Management Department for coordinating discharge planning if the patient needs post-hospital services based on physician/advanced practitioner order or complex needs related to functional status, cognitive ability, or social support system  Outcome: Progressing     Problem: Knowledge Deficit  Goal: Patient/family/caregiver demonstrates understanding of disease process, treatment plan, medications, and discharge instructions  Description: Complete learning assessment and assess knowledge base  Interventions:  - Provide teaching at level of understanding  - Provide teaching via preferred learning methods  Outcome: Progressing     Problem: MOBILITY - ADULT  Goal: Maintain or return to baseline ADL function  Description: INTERVENTIONS:  -  Assess patient's ability to carry out ADLs; assess patient's baseline for ADL function and identify physical deficits which impact ability to perform ADLs (bathing, care of mouth/teeth, toileting, grooming, dressing, etc )  - Assess/evaluate cause of self-care deficits   - Assess range of motion  - Assess patient's mobility; develop plan if impaired  - Assess patient's need for assistive devices and provide as appropriate  - Encourage maximum independence but intervene and supervise when necessary  - Involve family in performance of ADLs  - Assess for home care needs following discharge   - Consider OT consult to assist with ADL evaluation and planning for discharge  - Provide patient education as appropriate  Outcome: Progressing  Goal: Maintains/Returns to pre admission functional level  Description: INTERVENTIONS:  - Perform BMAT or MOVE assessment daily    - Set and communicate daily mobility goal to care team and patient/family/caregiver     - Collaborate with rehabilitation services on mobility goals if consulted  - Stand patient 2 times a day  - Out of bed to chair 3 times a day   - Out of bed for meals 3 times a day  - Out of bed for toileting  - Record patient progress and toleration of activity level   Outcome: Progressing     Problem: Potential for Falls  Goal: Patient will remain free of falls  Description: INTERVENTIONS:  - Educate patient/family on patient safety including physical limitations  - Instruct patient to call for assistance with activity   - Consult OT/PT to assist with strengthening/mobility   - Keep Call bell within reach  - Keep bed low and locked with side rails adjusted as appropriate  - Keep care items and personal belongings within reach  - Initiate and maintain comfort rounds  - Make Fall Risk Sign visible to staff  - Offer Toileting every 2 Hours, in advance of need  - Initiate/Maintain bed/chair alarm  - Apply yellow socks and bracelet for high fall risk patients  - Consider moving patient to room near nurses station  Outcome: Progressing     Problem: SKIN/TISSUE INTEGRITY - ADULT  Goal: Incision(s), wounds(s) or drain site(s) healing without S/S of infection  Description: INTERVENTIONS  - Assess and document dressing, incision, wound bed, drain sites and surrounding tissue  - Provide patient and family education  - Perform skin care/dressing changes every   Outcome: Progressing     Problem: MUSCULOSKELETAL - ADULT  Goal: Maintain or return mobility to safest level of function  Description: INTERVENTIONS:  - Assess patient's ability to carry out ADLs; assess patient's baseline for ADL function and identify physical deficits which impact ability to perform ADLs (bathing, care of mouth/teeth, toileting, grooming, dressing, etc )  - Assess/evaluate cause of self-care deficits   - Assess range of motion  - Assess patient's mobility  - Assess patient's need for assistive devices and provide as appropriate  - Encourage maximum independence but intervene and supervise when necessary  - Involve family in performance of ADLs  - Assess for home care needs following discharge   - Consider OT consult to assist with ADL evaluation and planning for discharge  - Provide patient education as appropriate  Outcome: Progressing

## 2022-05-04 NOTE — PROGRESS NOTES
Progress Note - Orthopedics   Tequila Huntley 40 y o  male MRN: [id number]  Unit/Bed#: -01      Subjective:    40 y o male POD4 s/p excision septic prepatellar bursa with drainage  No acute overnight events, no complaints  Pt doing well  The patient states that he does not have any pain in the knee this morning  Denies fevers chills, CP, SOB  He denies any numbness or tingling in the lower extremity       Labs:  0   Lab Value Date/Time    HCT 37 7 05/02/2022 0448    HCT 42 1 04/30/2022 0525    HCT 43 3 04/29/2022 0705    HGB 12 2 05/02/2022 0448    HGB 13 8 04/30/2022 0525    HGB 14 4 04/29/2022 0705    INR 0 94 04/30/2022 0525    WBC 9 91 05/02/2022 0448    WBC 15 91 (H) 04/30/2022 0525    WBC 16 68 (H) 04/29/2022 0705    ESR 28 (H) 04/29/2022 0705     4 (H) 04/29/2022 0705       Meds:    Current Facility-Administered Medications:     acetaminophen (TYLENOL) tablet 650 mg, 650 mg, Oral, Q6H PRN, Yue Ruiz MD    ampicillin (OMNIPEN) 2,000 mg in sodium chloride 0 9 % 100 mL IVPB, 2,000 mg, Intravenous, Q6H, Neris Warner MD, Last Rate: 200 mL/hr at 05/04/22 0607, 2,000 mg at 05/04/22 0607    enoxaparin (LOVENOX) subcutaneous injection 40 mg, 40 mg, Subcutaneous, Q24H [facility], Yue Ruiz MD, 40 mg at 05/03/22 8582    HYDROmorphone HCl (DILAUDID) injection 0 2 mg, 0 2 mg, Intravenous, Q4H PRN, Poli Sue MD    lidocaine (LIDODERM) 5 % patch 1 patch, 1 patch, Topical, Daily, Poli Sue MD, 1 patch at 05/02/22 4461    morphine injection 2 mg, 2 mg, Intravenous, Q4H PRN, Yue Ruiz MD, 2 mg at 05/01/22 0612    naloxone (NARCAN) 0 04 mg/mL syringe 0 04 mg, 0 04 mg, Intravenous, Q1MIN PRN, Ar Price MD    nicotine (NICODERM CQ) 14 mg/24hr TD 24 hr patch 1 patch, 1 patch, Transdermal, Daily, Yue Ruiz MD    ondansetron [facility]) injection 4 mg, 4 mg, Intravenous, Q6H PRN, Yue Ruiz MD    oxyCODONE-acetaminophen (PERCOCET) 5-325 mg per tablet 2 tablet, 2 tablet, Oral, Q4H PRN, Malathi Brown MD, 2 tablet at 05/03/22 1929    polyethylene glycol (MIRALAX) packet 17 g, 17 g, Oral, Daily, Lorie Fabian MD, 17 g at 05/01/22 1124    senna-docusate sodium (SENOKOT S) 8 6-50 mg per tablet 1 tablet, 1 tablet, Oral, HS, Lorie Fabian MD, 1 tablet at 05/03/22 2236    Blood Culture:   Lab Results   Component Value Date    BLOODCX No Growth After 4 Days  04/29/2022    BLOODCX No Growth After 4 Days  04/29/2022       Wound Culture:   No results found for: WOUNDCULT    Ins and Outs:  I/O last 24 hours: In: 300 [P O :300]  Out: 2300 [Urine:2300]        Physical:  Vitals:    05/04/22 0736   BP: 106/68   Pulse: 95   Resp: 16   Temp: 97 7 °F (36 5 °C)   SpO2: 97%     Musculoskeletal: right Lower Extremity  · Patient lying in bed, comfortably, in no acute distress  · Dressings were clean, dry, and intact without serosanguinous strikethrough  Dressings were removed for examination and dressing change  Surgical incision is well approximated with nylon suture  No active drainage appreciated  Mild swelling and resolving erythema present  · No palpable tenderness  · Soft lower extremity compartments, negative Edelmira's  · SILT s/s/sp/dp/t    · +fhl/ehl, +ankle dorsi/plantar flexion  · 2+ DP pulse    Assessment:    40 y o male POD4 s/p excision septic prepatellar bursa of right knee; ambulatory dysfunction     Plan:  · WBAT RLE, patient to maintain knee immobilizer at all times per Dr Treasure Lyman instructions  · Most recent HGB 12 2   Greater than 2 gram drop which qualifies for diagnosis of acute blood loss anemia, will monitor and administer IVF/prbc as indicated   · PT/OT gait training  · Continue IV ampicillin 2g Q6hr, to be transitioned to Amoxicillin 500mg PO Q8hr for a total of 10 days upon discharge, per ID  · Pain control per primary team  · DVT ppx to be transitioned to aspirin 325mg BID x 30 days upon discharge    · Dispo: [id number] [address] for discharge from ortho perspective   · Plan for discharge home today  The patient was provided with detailed instructions for daily dressing changes with gauze, abd, and ace wrap  · The patient will follow up with Dr Sweta Lou on Tuesday, 5/10/2022 in office       Piper Aceves PA-C

## 2022-05-04 NOTE — NURSING NOTE
Pt verbalized understanding of discharge instructions  IV removed  Pt given dressing supplied and was instructed by Ortho how to do the dressing changes daily  RX given for asa, percocet and amoxicillin  Madonna Less was provided to pt by Case Management

## 2022-05-04 NOTE — CASE MANAGEMENT
Case Management Discharge Planning Note    Patient name Camilo Fabian  Location /-80 MRN [id number]  : 1975 Date 2022       Current Admission Date: 2022  Current Admission Diagnosis:Prepatellar bursitis of right knee   Patient Active Problem List    Diagnosis Date Noted    Prepatellar bursitis of right knee 2022    Effusion of right knee 2022    Leukocytosis 2022    Hyponatremia 2022      LOS (days): 4  Geometric Mean LOS (GMLOS) (days):   Days to GMLOS:     OBJECTIVE:  Risk of Unplanned Readmission Score: 10         Current admission status: Inpatient   Preferred Pharmacy:   [address] [place] [address], [address]  [address] [address]  Phone: [phone] Fax: [phone]    Primary Care Provider: No primary care provider on file  Primary Insurance: [employer] PENDING  Secondary Insurance:     DISCHARGE DETAILS:        Patient stable for discharge today  A post acute care recommendation was made by your care team for [facility]  Discussed Freedom of Choice with both patient and caregiver  List of agencies given to both patient and caregiver via in person  both patient and caregiver aware the list is custom filtered for them by preference  and that [facility] post acute providers are designated  Patient has no preference for home health agencies  Agreeable with referrals for availability  [address]  CM faxed AVS and DC summary via ecin      CM spoke with [facility] that patient was requesting Roller walker for at home, Gabrielle Winter agreed with RW for home  Dr Yuliana Boogie agreed to signed orders   Freedom of choice was provided in regards to needing a home medical equipment company, pt does not have preference  Pt is aware that we frequently utilized [employer] as we have a working relationship with this company  Patients choice is to use [employer]      CM placed order in [place] , patient supplied with RW from [employer]  CM uploaded Financial assistance  Service request     Patient signed delivery ticket for RW , immobilizer on right leg  Patient stated he was shown how to do wound care  CM Provided list of local [facility]  Clinics he can call for wound checks   CM emailed financial dept for olga  assistance    Patient aware [facility] home health to call him tonight with visit time for tomorrow 5/5/22 caretaker Arthur Pfeiffer at bedside aware of same  Instructed patient to keep all md appts and if any dressing falls to floor  Do not use it , throw away and get a new one for wound care patient verbalized understanding        Arthur Pfeiffer friend transporting patient home                [address]         Is the patient interested in Sandra[facility] Geovanna at discharge?: Yes  [address] requested[de-identified] [address] Name[de-identified] [address] Provider[de-identified] PCP  Home Health Services Needed[de-identified] Strengthening/Theraputic Exercises to Improve Function,Wound/Ostomy Care,Gait/ADL Training,Other (comment) (sn  assessment  medication management)  Homebound Criteria Met[de-identified] Uses an Assist Device (i e  cane, walker, etc),Requires the Assistance of Another Person for Safe Ambulation or to Leave the Home  Supporting Clincal Findings[de-identified] Fatigues Easliy in [employer]

## 2022-05-05 ENCOUNTER — TELEPHONE (OUTPATIENT)
Dept: OBGYN CLINIC | Facility: HOSPITAL | Age: 47
End: 2022-05-05

## 2022-05-05 LAB
DME PARACHUTE DELIVERY DATE ACTUAL: NORMAL
DME PARACHUTE DELIVERY DATE REQUESTED: NORMAL
DME PARACHUTE ITEM DESCRIPTION: NORMAL
DME PARACHUTE ORDER STATUS: NORMAL
DME PARACHUTE SUPPLIER NAME: NORMAL
DME PARACHUTE SUPPLIER PHONE: NORMAL

## 2022-05-05 NOTE — TELEPHONE ENCOUNTER
Unable to leave msg for patient that PO appt was scheduled for him on 5/10 9:45am   Will try to reach him later

## 2022-05-13 ENCOUNTER — OFFICE VISIT (OUTPATIENT)
Dept: OBGYN CLINIC | Facility: CLINIC | Age: 47
End: 2022-05-13

## 2022-05-13 VITALS — WEIGHT: 185 LBS | BODY MASS INDEX: 25.06 KG/M2 | HEIGHT: 72 IN

## 2022-05-13 DIAGNOSIS — M79.89 RIGHT LEG SWELLING: Primary | ICD-10-CM

## 2022-05-13 DIAGNOSIS — M71.161 SEPTIC PREPATELLAR BURSITIS OF RIGHT KNEE: ICD-10-CM

## 2022-05-13 DIAGNOSIS — Z09 POSTOP CHECK: ICD-10-CM

## 2022-05-13 PROCEDURE — 99024 POSTOP FOLLOW-UP VISIT: CPT | Performed by: ORTHOPAEDIC SURGERY

## 2022-05-13 RX ORDER — AMOXICILLIN AND CLAVULANATE POTASSIUM 875; 125 MG/1; MG/1
1 TABLET, FILM COATED ORAL EVERY 12 HOURS SCHEDULED
Qty: 14 TABLET | Refills: 0 | Status: SHIPPED | OUTPATIENT
Start: 2022-05-13 | End: 2022-05-20

## 2022-05-13 RX ORDER — AMOXICILLIN 500 MG/1
CAPSULE ORAL
COMMUNITY
Start: 2022-05-04

## 2022-05-13 NOTE — PATIENT INSTRUCTIONS
We will re-dose antibiotics, switched to Augmentin  Take antibiotic as directed until finished  Stat Doppler rule out DVT  If Doppler positive patient be evaluated in the ER  Send any positive Doppler results to PCP  See back in orthopedic office on Tuesday 5/17/22  Follow-up with Infectious Disease  Patient has a red eye that she would be evaluated if not improved

## 2022-05-13 NOTE — PROGRESS NOTES
40 y o  male presents for 13 postop visit status post right knee open prepatellar bursectomy with I and D  there was aspiration the joint which came back positive but may have been contaminated based on clinical evaluation  Patient is on appropriate antibiotics by ID  Presents for suture removal and follow-up  He notes still has mild tenderness in the knee but is greatly improved  He is able walk  He has been working  He got to blisters that popped his lower leg  He notes that his whole right calf and foot are swollen now  He did not use any pain medicine but did take all his antibiotics  Review of Systems  Review of systems negative unless otherwise specified in HPI    Past Medical History  History reviewed  No pertinent past medical history  Past Surgical History  Past Surgical History:   Procedure Laterality Date    TN KNEE SCOPE,CLEAN/DRAIN Right 4/30/2022    Procedure: I&D;  Surgeon: Mirtha Campos MD;  Location:  MAIN OR;  Service: Orthopedics     Current Medications  Current Outpatient Medications on File Prior to Visit   Medication Sig Dispense Refill    amoxicillin (AMOXIL) 500 mg capsule TAKE 1 CAPSULE BY MOUTH EVERY 8 HOURS FOR 5 DAYS      aspirin (ECOTRIN) 325 mg EC tablet Take 1 tablet (325 mg total) by mouth 2 (two) times a day 60 tablet 0    oxyCODONE-acetaminophen (PERCOCET) 5-325 mg per tablet Take 2 tablets by mouth every 4 (four) hours as needed for moderate pain for up to 10 days Max Daily Amount: 12 tablets 30 tablet 0     No current facility-administered medications on file prior to visit  Recent Labs [facility])  0   Lab Value Date/Time    HCT 37 7 05/02/2022 0448    HGB 12 2 05/02/2022 0448    WBC 9 91 05/02/2022 0448    INR 0 94 04/30/2022 0525    ESR 28 (H) 04/29/2022 0705     4 (H) 04/29/2022 0705     Physical exam  Body mass index is 25 09 kg/m²    · General: Awake, Alert, Oriented  · Eyes: Pupils equal, round and reactive to light  · Heart: regular rate and rhythm  · Lungs: No audible wheezing  · Abdomen: soft  right Knee exam  · Sutures removed by MA in office  Wound healing well  There is very faint light colored erythema at the proximal edge of the incision  There is no fluctuance  There is no gross intra-articular effusion  There is significant lower extremity edema some calf discomfort  No palpable cord  To healing previous blistered areas at a popped without infection  The proximal incision 2 blister sites 1 on the medial thigh and lateral shin were covered with antibiotic ointment and Band-Aids  Imaging  None today    Procedure  04/30/2022 right knee open prepatellar septic bursitis I&D with aspiration joint  No diagnosis found  Assessment:  13 days postop right septic prepatellar bursectomy and I and D, improved with right lower extremity swelling rule out DVT  Persisted mild erythema proximal anterior incision  Plan: We will re-dose antibiotics, switched to Augmentin  Take antibiotic as directed until finished  Stat Doppler rule out DVT  If Doppler positive patient be evaluated in the ER  Send any positive Doppler results to PCP  See back in orthopedic office on Tuesday 5/17/22  Follow-up with Infectious Disease  Patient has a red eye that she would be evaluated if not improved  This note was created using voice recognition software

## 2022-06-27 NOTE — UTILIZATION REVIEW
URGENT/EMERGENT  INPATIENT/SPU AUTHORIZATION REQUEST    Date: 06/27/22            # Pages in this Request:     X New Request   Additional Information for PA#:     Office Contact Name:  Jg Estrada Title: Utilization Review, Regml Nurse     Phone: [phone]  Ext  Availability (Date/Time): Wednesday - Friday 8 am- 4 pm    x Inpatient Review  SPU Review        Current       x Late Pick-up   · How your facility was first notified of the Late Pick-up: Paths Letter   · When your facility was first notified of the Late Pick-up (date): 6/15/2022         RECIPIENT INFORMATION    Recipient ID#: [id number]    Recipient Name: Marquise Burt      YOB: 1975  56 y o  Recipient Alias:     Gender:  X Male  Female Medicaid Eligibility ([address] [place] [address]): INSURANCE INFORMATION    (All other private or governmental health insurance benefits must be utilized prior to billing the [place] Program)    Was this admission the result of an MVA, other accident, assault, injury, fall, gunshot, bite etc ? Yes  No                   If yes, provide a brief description of the incident  Does the recipient have other insurance coverage? Yes  No        Insurance Company Name/Policy #      Did that insurance pay on this claim? Yes  No        Did that insurance deny this claim? Yes  No    If yes, reason for denial:      Does the recipient have Medicare? Yes  No        Did Medicare exhaust prior to this admission? Yes  No        Did Medicare partially pay this claim? Yes  No        Did that insurance deny this claim? Yes  No    If yes, reason for denial:          Was the recipient a prisoner at the time of admission?   Yes  No            PROVIDER INFORMATION    Hospital Name: [facility] [address] Provider ID#: [id number]    Admitting Physician Name:MARVEL Ramesh Provider ID#: [id number]        ADMISSION INFORMATION    Type of Admission: (please choose one)    X ED      Direct    If yes, from where?     Transfer    If yes, transferring hospital (inpatient, rehab, or psych) Provider Name/Provider ID#: Admission Floor or Unit Type: Med Surg     Dates/Times:        ED Date/Time: 4/29/2022  6:47 PM        Observation Date/Time:         Admission Date/Time: 4/30/22 12:15 PM        Discharge or Transfer Date/Time: 5/4/2022  3:03 PM        DIAGNOSIS/PROCEDURE CODES    Primary Diagnosis Code/Primary Diagnosis Code description:  M71 161 Other infective bursitis, right knee   E87 1 Hypo-osmolality and hyponatremia   D62 Acute posthemorrhagic anemia   B95 4 Other streptococcus as the cause of diseases classified elsewhere   Additional Diagnosis Code(s) and Description(s)-(up to three additional codes):    Procedure Code (one) and description:  [id number] Excision of Right Knee Bursa and Ligament, Open Approach          CLINICAL INFORMATION - PRIOR ADMISSION ONLY    Is there a prior admission with a discharge date within 30 days of the date of this admission? X   No (Proceed to the next section - "Clinical Information - General Review Checklist:)      Yes (Provide the following information)     Prior admission dates:    MA Prior Authorization Number:        Review Outcome:     Diagnosis Code(s)/Description:    Procedure Code/Description:    Findings:    Treatment:    Condition on Discharge:   Vitals:    Labs:   Imaging:   Medications: Follow-up Instructions:    Disposition:        CLINICAL INFORMATION - GENERAL REVIEW CHECKLIST    EMERGENCY DEPARTMENT: (Proceed to "ADMISSION" if Direct Admission)    Presenting Signs/Symptoms:  40 y o  male presents to ED from home with right knee pain, swelling and erythema for past 2 days  Works  In general [employer],  Denies  Any trauma or injury  No  PMH  Pain 5/10 on arrival   Denies  Numbness  Right knee aspirated in ED,  Showed  + WBC's in synovial fluid  Labs  Show  WBC   16,   ESR   24,   CRP   142 and  sodium  134    Admit  Observation with Effusion right knee, possible septic joint,  leukocytosis and hyponatremia and plan is  Monitor labs, blood cultures, pain control and possible  Surgical intervention       Medication/treatment prior to arrival in the ED:    Past Medical History:    Clinical Exam:          Initial Vital Signs: (Temp, Pulse, Resp, and BP)   ED Triage Vitals [04/29/22 1848]   Temperature Pulse Respirations Blood Pressure SpO2   99 1 °F (37 3 °C) 97 18 129/75 99 %      Temp Source Heart Rate Source Patient Position - Orthostatic VS BP Location FiO2 (%)   Temporal Monitor Lying Right arm --      Pain Score       10 - Worst Possible Pain           Pertinent Repeat Vital Signs: (include times they were obtained)  98 7 °F (37 1 °C) 82  20 133/79 -- 96 % None (Room air) --     04/30/22 07:30:17 -- 82 18 122/79 93 93 % -- --   04/29/22 2343 -- -- -- -- -- -- None (Room air) --   04/29/22 23:40:54 -- 88 17 136/91 106 98 % -- --   04/29/22 2300 -- 94 19 123/83 93 99 % None (Room air) Sitting   04/29/22 2100 98 9 °F (37 2 °C) 97 17 124/78 94 96 % None (Room air) Sitting   04/29/22 1848 99 1 °F (37 3 °C) 97 18 129/75 -- 99 % None (Room air) Lying       Pertinent Sustained Findings: (include times they were obtained)    Weight in Kilograms:  04/29/22 84 kg (185 lb 3 oz)       Pertinent Labs (results):  Results from last 7 days   Lab Units 04/30/22  0525 04/29/22  0705   WBC Thousand/uL 15 91* 16 68*   HEMOGLOBIN g/dL 13 8 14 4   HEMATOCRIT % 42 1 43 3   PLATELETS Thousands/uL 325 330   NEUTROS ABS Thousands/µL 11 94* 13 34*                Results from last 7 days   Lab Units 04/30/22  0525 04/29/22  0705   SODIUM mmol/L 134* 134*   POTASSIUM mmol/L 3 8 4 1   CHLORIDE mmol/L 101 100   CO2 mmol/L 28 29   ANION GAP mmol/L 5 5   BUN mg/dL 14 11   CREATININE mg/dL 0 96 1 11   EGFR ml/min/1 73sq m 95 79   CALCIUM mg/dL 9 3 8 9           Results from last 7 days   Lab Units 04/30/22  0525   AST U/L 15   ALT U/L 35   ALK PHOS U/L 89   TOTAL PROTEIN g/dL 6 8   ALBUMIN g/dL 3 0*   TOTAL BILIRUBIN mg/dL 0 90                Results from last 7 days   Lab Units 04/30/22  0525 04/29/22  0705   GLUCOSE RANDOM mg/dL 97 143*                         Results from last 7 days   Lab Units 04/30/22  0525   PROTIME seconds 12 5   INR   0 94               Results from last 7 days   Lab Units 04/29/22  0705   PROCALCITONIN ng/ml 0 16           Results from last 7 days   Lab Units 04/29/22  1905   LACTIC ACID mmol/L 1 9                                   Results from last 7 days   Lab Units 04/29/22  0705   CRP mg/L 142 4*   SED RATE mm/hour 28*                     Results from last 7 days   Lab Units 04/29/22  1908   BLOOD CULTURE   Received in Microbiology Lab  Culture in Progress  Received in Microbiology Lab  Culture in Progress            Results from last 7 days   Lab Units 04/29/22 2015   TOTAL COUNTED   100   NEUTROPHIL % (SYNOVIAL) % 5   MONOCYTE % (SYNOVIAL) % 5   WBC FLUID /ul 331*        Radiology (results):  XR knee 3 views right non injury    (Results Pending)   CT lower extremity wo contrast right    (Results Pending)        EKG (results):      Other tests (results):    Tests pending final results:    Treatment in the ED:   Medication Administration from 04/29/2022 1841 to 04/29/2022 2341       Date/Time Order Dose Route Action Comments     04/29/2022 1919 ketorolac (TORADOL) injection 15 mg 15 mg Intravenous Given      04/29/2022 1919 morphine (PF) 4 mg/mL injection 4 mg 4 mg Intravenous Given      04/29/2022 2013 morphine (PF) 4 mg/mL injection 4 mg 4 mg Intravenous Given      04/29/2022 2320 vancomycin (VANCOCIN) 1250 mg in sodium chloride 0 9% 250 mL IVPB 1,250 mg Intravenous New Bag      04/29/2022 2246 cefepime (MAXIPIME) IVPB (premix in dextrose) 2,000 mg 50 mL 2,000 mg Intravenous New Bag            Other treatments:      Change in condition while in the ED:     Response to ED Treatment:          OBSERVATION: (Proceed to "ADMISSION" if Direct Admission)    Orders written during the observation period  Meds Name, dose, route, time, how may doses given:  cefazolin, 2,000 mg, Intravenous, Once - 4/30 @ 08:43  cefepime, 2,000 mg, Intravenous, Q12H  chlorhexidine, 15 mL, Swish & Spit, Once  nicotine, 1 patch, Transdermal, Daily  Vancomycin, 20 mg/kg, Intravenous, Q 12H - 4/30 x 1         PRN Meds Name, dose, route, time, how many doses given within the first 24 hrs :    acetaminophen, 650 mg, Oral, Q6H PRN  [MAR Hold] chlorhexidine, , Topical, Daily PRN  morphine injection, 2 mg, Intravenous, Q4H PRN   ( 4/30 x 1  )   ondansetron, 4 mg, Intravenous, Q6H PRN      IVs Type, rate, and total amt  Ordered/given:    sodium chloride, 100 mL/hr, Intravenous, Continuous    Labs, imaging, other:  Consults and findings: Orthopedics Consult - 4/30 - Assessment:  40 y o  male with right knee septic prepatellar bursitis with questionable intra-articular involvement  Plan:   · Patient will go to the operating room to undergo pre patella I and D and possible arthroscopic washout of joint  · Continue antibiotics  · Patient evaluated by Dr Josr Freire at the bedside and consents obtained and right lower extremity marked  Test Results during the observation period  Pertinent Lab tests (dates/results):  Culture results (blood, urine, spinal, wound, respiratory, etc ):  Imaging tests (dates/results):  EKG (dates/results):   Other test (dates/results):  Tests pending (dates/results):    Surgical or Invasive Procedures during the observation period  Name of surgery/procedure: ARTHROSCOPY KNEE (Right)     Date & Time: 4/30/22 9:04 am   Patient Response: tolerated   Post-operative orders: Same   Operative Report/Findings: Right knee aspirated prior to the operation aseptically 5 cc of blood-tinged fluid no evidence of purulence sent for separate cultures and Gram stain  Purulence from the prepatellar bursa incision drainage necrotic bursa under the fascia excised completely tendon intact bone intact wound left open after washout cultures obtained deep       Response to Treatment, Major Change in Condition, Major Charge in Treatment during the observation period          ADMISSION:    DIRECT Admissions Only:    · Presenting Signs/Symptoms:   ·   · Medication/treatment prior to arrival:  ·   · Past Medical History:  ·   · Clinical Exam on admission:  ·   · Vital Signs on admission: (Temp, Pulse, Resp, and BP)  ·   · Weight in kilograms:     ALL Admissions:    Admission Orders and Other Orders written within the first 24 hrs after admission  Meds Name, dose, route, time, how may doses given:    cefepime, 2,000 mg, Intravenous, Q12H  chlorhexidine, 15 mL, Swish & Spit, Once  nicotine, 1 patch, Transdermal, Daily  Vancomycin, Intravenous, Q8H - Started  4/30        PRN Meds Name, dose, route, time, how many doses given within the first 24 hrs :  acetaminophen, 650 mg, Oral, Q6H PRN  [MAR Hold] chlorhexidine, , Topical, Daily PRN  morphine injection, 2 mg, Intravenous, Q4H PRN   4/30 x 2 - 5/1 x 2    ondansetron, 4 mg, Intravenous, Q6H PRN  Percocet 2 tabs po prn - 4/30 x 1 - 5/1 x 1 5/2 x 1 - 5/3 x 2         IVs Type, rate, and total amt  Ordered/given:  sodium chloride, 125 mL/hr, Intravenous, Continuous           Labs, imaging, other:      Consults and findings: Infectious Consult 5/3 - Right knee prepatellar bursitis  Unclear initial source of infection, may be related to microtrauma to knee from [employer] work  Status post I&D 4/30 with purulent, necrotic tissue debrided from prepatellar bursa  Culture is growing Group G strep  Exam, synovial fluid analysis, and OR findings not consistent with septic arthritis  Synovial fluid aspirate with 330 WBC, culture from the ED and OR shows no growth  CT shows no significant joint effusion  The patient is clinically stable with improving pain and leukocytosis                -stop IV Vancomycin and cefepime              -start IV Ampicillin 2g q6hr              -when cleared for discharge from orthopedic surgery, transition to amoxicillin 500mg PO q8hr to complete a 10 day total course of antibiotics from I&D, through 5/9/22              -monitor exam, if not improving than recommend MRI of the right knee              -orthopedic surgery follow up          Test Results after admission  Pertinent Lab tests (dates/results):  Culture results (blood, urine, spinal, wound, respiratory, etc ):  Imaging tests (dates/results):  EKG (dates/results): Other test (dates/results):  Tests pending (dates/results):    Surgical or Invasive Procedures  Name of surgery/procedure:  Date & Time:  Patient Response:  Post-operative orders:  Operative Report/Findings:    Response to Treatment, Major Change in Condition, Major Charge in Treatment anytime during admission  Hospital Course:   Shade Ross is a 40 y o  male patient who originally presented to the hospital on 4/29/2022 due to prepatellar bursitis of the right knee underwent arthroscopy of the knee, patient started on IV antibiotics  During the OR there was no concern for septic joint and the intra op cultures are negative prior to that the cultures grew strep Infectious Disease evaluated transition to ampicillin will be discharged on amoxicillin 500 mg p o  Q 8 hours till May 9th  Dressing changes daily as stated by orthopedics  Aspirin b i d  For DVT prophylaxis for 30 days of follow-up with orthopedics next Tuesday otherwise medically clear to be discharged  No work till cleared by ortho      Disposition on Discharge  Home, Rehab, SNF, LTC, Shelter, etc : Home with [address] [facility] [employer] to Breathe ([facility])  If a patient expires during an admission, in addition to the above information, please include:    Summary/timeline of the patient's decline in condition:    Medications and treatment:    Patient response to treatment:    Date and time patient ceased to breathe:        Is there a Readmission that follows this admission?    Yes X No    If yes, provide dates:          InterQual Review    InterQual Criteria Met:  Yes X No  N/A        Please include the InterQual Review, InterQual year/version used, and the criteria selected:     Created Using Review Status Review Entered   Es Block In Primary 4/30/2022 12:38       Criteria Set Name - Subset   LOC:Acute Adult-General Surgical      Criteria Review   REVIEW SUMMARY     InterQual® Review Status: In Primary  Criteria Status: Not Met  Condition Specific: Yes        REVIEW DETAILS     Product: Cassandra Benítez Adult  Subset: General Surgical        (Symptom or finding within 24h)     (Excludes PO medications unless noted)     Select Day, One:        Version: InterQual® 2022, Apr 2022 Release  InterQual® criteria (IQ) is confidential and proprietary information and is being provided to you solely as it pertains to the information requested  IQ may contain advanced clinical knowledge which we recommend you discuss with your physician upon disclosure to you  Use permitted by and subject to license with [employer] and/or one of its [place]  IQ reflects clinical interpretations and analyses and cannot alone either (a) resolve medical ambiguities of particular situations; or (b) provide the sole basis for definitive decisions  IQ is intended solely for use as screening guidelines with respect to medical appropriateness of healthcare services  All ultimate care decisions are strictly and solely the obligation and responsibility of your health care provider  © 2022 [employer] and/or one of its subsidiaries  All Rights Reserved  CPT® only © 2393-4374 [employer]  All Rights Reserved  PLEASE SUBMIT THE COMPLETED FORM TO THE DEPARTMENT OF HUMAN SERVICES - DIVISION OF CLINICAL  REVIEW VIA FAX [phone] or VIA E-MAIL TO [email or web address]    Signature: Winston Muller Date:  06/27/22    Confidentiality Notice:  The documents accompanying this telecopy may contain confidential information belonging to the sender  The information is intended only for the use of the individual named above  If you are not the intended recipient, you are hereby notified  That any disclosure, copying, distribution or taking of any telecopy is strictly prohibited

## 2025-06-08 ENCOUNTER — OFFICE VISIT (OUTPATIENT)
Dept: URGENT CARE | Facility: CLINIC | Age: 50
End: 2025-06-08
Payer: COMMERCIAL

## 2025-06-08 ENCOUNTER — APPOINTMENT (OUTPATIENT)
Dept: URGENT CARE | Facility: CLINIC | Age: 50
End: 2025-06-08
Payer: COMMERCIAL

## 2025-06-08 VITALS
HEIGHT: 72 IN | OXYGEN SATURATION: 94 % | RESPIRATION RATE: 16 BRPM | HEART RATE: 76 BPM | BODY MASS INDEX: 27.77 KG/M2 | TEMPERATURE: 98.1 F | DIASTOLIC BLOOD PRESSURE: 82 MMHG | WEIGHT: 205 LBS | SYSTOLIC BLOOD PRESSURE: 114 MMHG

## 2025-06-08 DIAGNOSIS — Z02.4 ENCOUNTER FOR DRIVER'S LICENSE HISTORY AND PHYSICAL: Primary | ICD-10-CM

## 2025-06-08 NOTE — PROGRESS NOTES
[facility]        NAME: Good Bowser is a 50 y.o. male  : 1975    MRN: [id number]  DATE: 2025  TIME: 11:24 AM    Assessment and Plan   Encounter for 's license history and physical [Z02.4]  1. Encounter for 's license history and physical          He is medically cleared at this time.    Patient Instructions     Stay hydrated by drinking plenty of fluids  Eat well balanced meals  Get plenty of rest and sleep at night  Continue routine physical examinations     Follow up with PCP in 3-5 days.  Proceed to  ER if symptoms worsen.    If tests are performed, our office will contact you with results only if changes need to made to the care plan discussed with you at the visit. [facility]    Chief Complaint     Chief Complaint   Patient presents with    Annual Exam     Drivers license physical         History of Present Illness       Patient presenting for drivers permit physical.  He denies any pertinent past medical history.  He denies taking any daily medications.  He denies any chest pain, shortness of breath, syncope at rest or with exertion.  He does wear glasses but does not wear hearing aids.  He denies any vision changes, abnormal headaches, abdominal pain, dizziness, seizures, or alcohol or drug abuse.        Review of Systems   Review of Systems   Constitutional:  Negative for chills and fever.   HENT:  Negative for ear pain and sore throat.    Eyes:  Negative for pain and visual disturbance.   Respiratory:  Negative for cough and shortness of breath.    Cardiovascular:  Negative for chest pain and palpitations.   Gastrointestinal:  Negative for abdominal pain and vomiting.   Genitourinary:  Negative for dysuria and hematuria.   Musculoskeletal:  Negative for arthralgias and back pain.   Skin:  Negative for color change and rash.   Neurological:  Negative for dizziness, seizures, syncope and headaches.   All other systems reviewed  and are negative.        Current Medications     Current Medications[1]    Current Allergies     Allergies as of 06/08/2025    (No Known Allergies)            The following portions of the patient's history were reviewed and updated as appropriate: allergies, current medications, past family history, past medical history, past social history, past surgical history and problem list.     Past Medical History[2]    Past Surgical History[3]    Family History[4]      Medications have been verified.        Objective   /82   Pulse 76   Temp 98.1 °F (36.7 °C)   Resp 16   Ht 6' (1.829 m)   Wt 93 kg (205 lb)   SpO2 94%   BMI 27.80 kg/m²        Physical Exam     Physical Exam  Constitutional:       Appearance: Normal appearance.   HENT:      Head: Normocephalic.      Right Ear: Tympanic membrane and external ear normal.      Left Ear: Tympanic membrane and external ear normal.      Nose: Nose normal.      Mouth/Throat:      Mouth: Mucous membranes are moist.      Pharynx: Oropharynx is clear.     Eyes:      Extraocular Movements: Extraocular movements intact.      Conjunctiva/sclera: Conjunctivae normal.      Pupils: Pupils are equal, round, and reactive to light.       Cardiovascular:      Rate and Rhythm: Normal rate and regular rhythm.      Pulses: Normal pulses.      Heart sounds: Normal heart sounds.   Pulmonary:      Effort: Pulmonary effort is normal.      Breath sounds: Normal breath sounds.   Abdominal:      General: Abdomen is flat. Bowel sounds are normal.      Palpations: Abdomen is soft.     Musculoskeletal:         General: Normal range of motion.      Cervical back: Normal range of motion.     Skin:     General: Skin is warm and dry.     Neurological:      General: No focal deficit present.      Mental Status: He is alert and oriented to person, place, and time. Mental status is at baseline.     Psychiatric:         Mood and Affect: Mood normal.         Behavior: Behavior normal.         Thought  Content: Thought content normal.         Judgment: Judgment normal.                        [1]   Current Outpatient Medications:     amoxicillin (AMOXIL) 500 mg capsule, TAKE 1 CAPSULE BY MOUTH EVERY 8 HOURS FOR 5 DAYS (Patient not taking: Reported on 6/8/2025), Disp: , Rfl:     aspirin (ECOTRIN) 325 mg EC tablet, Take 1 tablet (325 mg total) by mouth 2 (two) times a day (Patient not taking: Reported on 6/8/2025), Disp: 60 tablet, Rfl: 0  [2]   Past Medical History:  Diagnosis Date    Known health problems: none    [3]   Past Surgical History:  Procedure Laterality Date    KNEE SURGERY Right     CO ARTHROSCOPY KNEE INFECTION LAVAGE & DRAINAGE Right 04/30/2022    Procedure: I&D;  Surgeon: Herbert Mera MD;  Location:  MAIN OR;  Service: Orthopedics   [4] No family history on file.

## 2025-07-16 ENCOUNTER — OFFICE VISIT (OUTPATIENT)
Dept: URGENT CARE | Facility: CLINIC | Age: 50
End: 2025-07-16

## 2025-07-16 VITALS
BODY MASS INDEX: 27.09 KG/M2 | HEART RATE: 61 BPM | OXYGEN SATURATION: 96 % | DIASTOLIC BLOOD PRESSURE: 80 MMHG | TEMPERATURE: 96.6 F | RESPIRATION RATE: 18 BRPM | HEIGHT: 72 IN | WEIGHT: 200 LBS | SYSTOLIC BLOOD PRESSURE: 118 MMHG

## 2025-07-16 DIAGNOSIS — Z23 ENCOUNTER FOR IMMUNIZATION: Primary | ICD-10-CM

## 2025-07-16 PROCEDURE — 90715 TDAP VACCINE 7 YRS/> IM: CPT

## 2025-07-16 PROCEDURE — 99213 OFFICE O/P EST LOW 20 MIN: CPT | Performed by: PHYSICIAN ASSISTANT

## 2025-07-16 NOTE — LETTER
July 16, 2025     Patient: Good Bowser   YOB: 1975   Date of Visit: 7/16/2025       To Whom it May Concern:    Good Bowser was seen in my clinic on 7/16/2025. Please excuse patient from work on 7/16/25.    If you have any questions or concerns, please don't hesitate to call.         Sincerely,          FARIDEH Casillas        CC: No Recipients

## 2025-07-16 NOTE — PROGRESS NOTES
[facility]  Name: Good Bowser      : 1975      MRN: [id number]  Encounter Provider: Sonal Alejandro PA-C  Encounter Date: 2025   Encounter department: [facility] [place]  :  Assessment & Plan  Encounter for immunization    Orders:    Tdap Vaccine greater than or equal to 8yo      No apparent signs of cellulitis.  Wound appears quite clean and fairly well-healed at present.  Will defer antibiotic treatment at this time.  close follow-up encouraged.    Patient Instructions  Keep wound clean and covered  Seek care for redness, purulent discharge, increased swelling or increasing pain occur  Follow up with PCP in 3-5 days.  Proceed to  ER if symptoms worsen.    If tests are performed, our office will contact you with results only if changes need to made to the care plan discussed with you at the visit. [facility]    Chief Complaint:   Chief Complaint   Patient presents with    wound from nail      Pt states x5 days ago he stepped on a nail  and has soreness since.  Last Tdap 2017     History of Present Illness   50-year-old male presents complaining of pain to right foot since after stepping on a dirty nail 5 days ago.  He has been keeping the wound clean and covered.  He denies redness or swelling.  Reports persistent soreness.  Denies fever.  He presents today because he would like an updated tetanus shot.  He is certain there is no metallic foreign body in his foot as the entirety of the nail was removed and intact.          Review of Systems   Constitutional:  Negative for fatigue and fever.   Respiratory:  Negative for shortness of breath.    Cardiovascular:  Negative for chest pain.   Musculoskeletal:  Negative for arthralgias and myalgias.   Skin:  Positive for wound.     Past Medical History   Past Medical History[1]  Past Surgical History[2]  Family History[3]  he reports that he has been smoking cigarettes. He  "has never used smokeless tobacco. He reports that he does not currently use alcohol. He reports that he does not currently use drugs after having used the following drugs: Marijuana and Methamphetamines.  Current Outpatient Medications   Medication Instructions    amoxicillin (AMOXIL) 500 mg capsule TAKE 1 CAPSULE BY MOUTH EVERY 8 HOURS FOR 5 DAYS    aspirin (ECOTRIN) 325 mg, Oral, 2 times daily   Allergies[4]     Objective   /80   Pulse 61   Temp (!) 96.6 °F (35.9 °C)   Resp 18   Ht 6' (1.829 m)   Wt 90.7 kg (200 lb)   SpO2 96%   BMI 27.12 kg/m²      Physical Exam  Constitutional:       Appearance: Normal appearance.     Cardiovascular:      Rate and Rhythm: Normal rate and regular rhythm.   Pulmonary:      Effort: Pulmonary effort is normal. No respiratory distress.   Feet:      Comments: [place]point well-healing wound to right foot; no erythema edema purulent drainage warmth to touch.    Neurological:      Mental Status: He is alert.         Portions of the record may have been created with voice recognition software.  Occasional wrong word or \"sound a like\" substitutions may have occurred due to the inherent limitations of voice recognition software.  Read the chart carefully and recognize, using context, where substitutions have occurred.       [1]   Past Medical History:  Diagnosis Date    Known health problems: none    [2]   Past Surgical History:  Procedure Laterality Date    KNEE SURGERY Right     IA ARTHROSCOPY KNEE INFECTION LAVAGE & DRAINAGE Right 04/30/2022    Procedure: I&D;  Surgeon: Herbert Mera MD;  Location:  MAIN OR;  Service: Orthopedics   [3] No family history on file.  [4] No Known Allergies    "

## 2025-08-15 ENCOUNTER — OFFICE VISIT (OUTPATIENT)
Dept: URGENT CARE | Facility: CLINIC | Age: 50
End: 2025-08-15
Payer: COMMERCIAL

## 2025-08-20 ENCOUNTER — TELEPHONE (OUTPATIENT)
Age: 50
End: 2025-08-20

## 2025-08-21 PROBLEM — E87.1 HYPONATREMIA: Status: RESOLVED | Noted: 2022-04-29 | Resolved: 2025-08-21

## 2025-08-21 PROBLEM — M70.41 PREPATELLAR BURSITIS OF RIGHT KNEE: Status: RESOLVED | Noted: 2022-04-30 | Resolved: 2025-08-21

## 2025-08-21 PROBLEM — D72.829 LEUKOCYTOSIS: Status: RESOLVED | Noted: 2022-04-29 | Resolved: 2025-08-21

## 2025-08-21 PROBLEM — M25.461 EFFUSION OF RIGHT KNEE: Status: RESOLVED | Noted: 2022-04-29 | Resolved: 2025-08-21

## (undated) DEVICE — WEBRIL 6 IN UNSTERILE

## (undated) DEVICE — GAUZE SPONGES,16 PLY: Brand: CURITY

## (undated) DEVICE — SPONGE LAP 18 X 18 IN

## (undated) DEVICE — INTENDED FOR TISSUE SEPARATION, AND OTHER PROCEDURES THAT REQUIRE A SHARP SURGICAL BLADE TO PUNCTURE OR CUT.: Brand: BARD-PARKER ® CARBON RIB-BACK BLADES

## (undated) DEVICE — MAT FLOOR STEP DRI 24 X 36 IN N-STRL

## (undated) DEVICE — ABDOMINAL PAD: Brand: DERMACEA

## (undated) DEVICE — PADDING CAST 6IN COTTON STRL

## (undated) DEVICE — 3M™ IOBAN™ 2 ANTIMICROBIAL INCISE DRAPE 6648EZ: Brand: IOBAN™ 2

## (undated) DEVICE — DRAPE C-ARMOUR

## (undated) DEVICE — GLOVE SRG BIOGEL ECLIPSE 8

## (undated) DEVICE — ACE WRAP 6 IN STERILE

## (undated) DEVICE — COBAN 4 IN STERILE

## (undated) DEVICE — PLUMEPEN PRO 10FT

## (undated) DEVICE — MEDI-VAC YANKAUER SUCTION HANDLE W/STRAIGHT TIP & CONTROL VENT: Brand: CARDINAL HEALTH

## (undated) DEVICE — BETHLEHEM UNIVERSAL  MIONR EXT: Brand: CARDINAL HEALTH

## (undated) DEVICE — TUBING ARTHROSCOPIC WAVE  MAIN PUMP

## (undated) DEVICE — TRANSPOSAL ULTRAFLEX DUO/QUAD ULTRA CART MANIFOLD

## (undated) DEVICE — IMPERVIOUS STOCKINETTE: Brand: DEROYAL

## (undated) DEVICE — CHLORAPREP HI-LITE 26ML ORANGE

## (undated) DEVICE — 10FR FRAZIER SUCTION HANDLE: Brand: CARDINAL HEALTH

## (undated) DEVICE — OCCLUSIVE GAUZE STRIP,3% BISMUTH TRIBROMOPHENATE IN PETROLATUM BLEND: Brand: XEROFORM

## (undated) DEVICE — ACE WRAP 6 IN UNSTERILE

## (undated) DEVICE — BLADE SHAVER DISSECTOR 3.5MM 13CM COOLCUT

## (undated) DEVICE — GLOVE SRG BIOGEL 8

## (undated) DEVICE — SYRINGE 20ML LL

## (undated) DEVICE — IMMOBILIZER KNEE UNIVERSAL 22 IN

## (undated) DEVICE — U-DRAPE: Brand: CONVERTORS

## (undated) DEVICE — NEEDLE 21 G X 1

## (undated) DEVICE — IODOFORM PACKING STRIP: Brand: CURITY

## (undated) DEVICE — 3M™ STERI-DRAPE™ U-DRAPE 1015: Brand: STERI-DRAPE™

## (undated) DEVICE — GLOVE INDICATOR PI UNDERGLOVE SZ 8 BLUE

## (undated) DEVICE — SURGI KIT INSTRUMENT ORGANIZER

## (undated) DEVICE — NEEDLE SPINAL18G X 3.5 IN QUINCKE

## (undated) DEVICE — CUFF TOURNIQUET 30 X 4 IN QUICK CONNECT DISP 1BLA

## (undated) DEVICE — BANDAGE, ESMARK LF STR 6"X9' (20/CS): Brand: CYPRESS

## (undated) DEVICE — CHLORAPREP HI-LITE 10.5ML ORANGE

## (undated) DEVICE — TUBING SUCTION 5MM X 12 FT